# Patient Record
Sex: FEMALE | Race: BLACK OR AFRICAN AMERICAN | NOT HISPANIC OR LATINO
[De-identification: names, ages, dates, MRNs, and addresses within clinical notes are randomized per-mention and may not be internally consistent; named-entity substitution may affect disease eponyms.]

---

## 2021-08-25 ENCOUNTER — APPOINTMENT (OUTPATIENT)
Dept: DERMATOLOGY | Facility: CLINIC | Age: 26
End: 2021-08-25
Payer: COMMERCIAL

## 2021-08-25 VITALS — HEIGHT: 63 IN | BODY MASS INDEX: 30.12 KG/M2 | WEIGHT: 170 LBS

## 2021-08-25 DIAGNOSIS — L30.4 ERYTHEMA INTERTRIGO: ICD-10-CM

## 2021-08-25 DIAGNOSIS — Z84.0 FAMILY HISTORY OF DISEASES OF THE SKIN AND SUBCUTANEOUS TISSUE: ICD-10-CM

## 2021-08-25 DIAGNOSIS — L81.9 DISORDER OF PIGMENTATION, UNSPECIFIED: ICD-10-CM

## 2021-08-25 PROCEDURE — 10040 EXTRACTION: CPT

## 2021-08-25 PROCEDURE — 99203 OFFICE O/P NEW LOW 30 MIN: CPT | Mod: 25

## 2021-08-25 NOTE — ASSESSMENT
[FreeTextEntry1] : intertrigo\par mix triamcinolone 0.1% cream with ketoconazole cream BID x2 weeks; SED\par \par acne\par Acne surgery was performed with an 11 blade to areas on forehead and cheek; SED\par tretinoin 0.025% cream at bedtime; SED

## 2021-08-25 NOTE — HISTORY OF PRESENT ILLNESS
[FreeTextEntry1] : rash under breasts [de-identified] : 26 year old female with rash under breasts and acne. no tx tried.

## 2021-08-25 NOTE — PHYSICAL EXAM
[FreeTextEntry3] : AAOx3, pleasant, NAD, no visual lymphadenopathy\par hair, scalp, face, nose, eyelids, ears, lips, oropharynx, neck, chest, abdomen, back, right arm, left arm, nails, and hands examined with all normal findings,\par pertinent findings include:\par \par multiple papules, pustules and open comedones on face\par erythema and hyperpigmentation under breasts

## 2021-09-24 ENCOUNTER — TRANSCRIPTION ENCOUNTER (OUTPATIENT)
Age: 26
End: 2021-09-24

## 2022-08-22 ENCOUNTER — APPOINTMENT (OUTPATIENT)
Dept: CARDIOLOGY | Facility: CLINIC | Age: 27
End: 2022-08-22

## 2022-08-22 ENCOUNTER — NON-APPOINTMENT (OUTPATIENT)
Age: 27
End: 2022-08-22

## 2022-08-22 VITALS
BODY MASS INDEX: 34.2 KG/M2 | SYSTOLIC BLOOD PRESSURE: 92 MMHG | WEIGHT: 193 LBS | OXYGEN SATURATION: 99 % | DIASTOLIC BLOOD PRESSURE: 60 MMHG | HEART RATE: 84 BPM | HEIGHT: 63 IN

## 2022-08-22 VITALS — DIASTOLIC BLOOD PRESSURE: 66 MMHG | SYSTOLIC BLOOD PRESSURE: 100 MMHG

## 2022-08-22 DIAGNOSIS — R94.31 ABNORMAL ELECTROCARDIOGRAM [ECG] [EKG]: ICD-10-CM

## 2022-08-22 DIAGNOSIS — R07.89 OTHER CHEST PAIN: ICD-10-CM

## 2022-08-22 PROCEDURE — 93242 EXT ECG>48HR<7D RECORDING: CPT

## 2022-08-22 PROCEDURE — 99204 OFFICE O/P NEW MOD 45 MIN: CPT | Mod: 25

## 2022-08-22 PROCEDURE — 93015 CV STRESS TEST SUPVJ I&R: CPT

## 2022-08-22 PROCEDURE — 93000 ELECTROCARDIOGRAM COMPLETE: CPT | Mod: 59

## 2022-08-22 RX ORDER — KETOCONAZOLE 20 MG/G
2 CREAM TOPICAL
Qty: 1 | Refills: 2 | Status: DISCONTINUED | COMMUNITY
Start: 2021-08-25 | End: 2022-08-22

## 2022-08-22 RX ORDER — TRIAMCINOLONE ACETONIDE 1 MG/G
0.1 CREAM TOPICAL
Qty: 1 | Refills: 2 | Status: DISCONTINUED | COMMUNITY
Start: 2021-08-25 | End: 2022-08-22

## 2022-08-22 RX ORDER — TRETINOIN 0.25 MG/G
0.03 CREAM TOPICAL
Qty: 1 | Refills: 2 | Status: DISCONTINUED | COMMUNITY
Start: 2021-08-25 | End: 2022-08-22

## 2022-08-22 NOTE — PHYSICAL EXAM
[Well Developed] : well developed [Well Nourished] : well nourished [No Acute Distress] : no acute distress [Normal Conjunctiva] : normal conjunctiva [Normal Venous Pressure] : normal venous pressure [No Carotid Bruit] : no carotid bruit [Normal S1, S2] : normal S1, S2 [No Rub] : no rub [No Gallop] : no gallop [Clear Lung Fields] : clear lung fields [Good Air Entry] : good air entry [No Respiratory Distress] : no respiratory distress  [Soft] : abdomen soft [Non Tender] : non-tender [No Masses/organomegaly] : no masses/organomegaly [Normal Bowel Sounds] : normal bowel sounds [Normal Gait] : normal gait [No Edema] : no edema [No Cyanosis] : no cyanosis [No Clubbing] : no clubbing [No Varicosities] : no varicosities [No Rash] : no rash [No Skin Lesions] : no skin lesions [Moves all extremities] : moves all extremities [No Focal Deficits] : no focal deficits [Normal Speech] : normal speech [Alert and Oriented] : alert and oriented [Normal memory] : normal memory [Murmur] : murmur [de-identified] : I/VI SELENE base

## 2022-08-22 NOTE — DISCUSSION/SUMMARY
[FreeTextEntry1] : 3-day cardiac monitor.\par Ex stress test to eval for evid of myocardial ischemia or exercise induced arrhythmia.\par Echo to eval LV function.\par Discussed potential triggers of ectopy.\par Further rec based on above.

## 2022-09-02 ENCOUNTER — NON-APPOINTMENT (OUTPATIENT)
Age: 27
End: 2022-09-02

## 2022-09-02 PROCEDURE — 93244 EXT ECG>48HR<7D REV&INTERPJ: CPT

## 2022-09-09 ENCOUNTER — NON-APPOINTMENT (OUTPATIENT)
Age: 27
End: 2022-09-09

## 2022-09-15 ENCOUNTER — APPOINTMENT (OUTPATIENT)
Dept: CARDIOLOGY | Facility: CLINIC | Age: 27
End: 2022-09-15

## 2022-09-15 ENCOUNTER — TRANSCRIPTION ENCOUNTER (OUTPATIENT)
Age: 27
End: 2022-09-15

## 2022-09-15 PROCEDURE — 93306 TTE W/DOPPLER COMPLETE: CPT

## 2022-09-27 ENCOUNTER — OUTPATIENT (OUTPATIENT)
Dept: OUTPATIENT SERVICES | Facility: HOSPITAL | Age: 27
LOS: 1 days | End: 2022-09-27
Payer: MEDICAID

## 2022-09-27 ENCOUNTER — APPOINTMENT (OUTPATIENT)
Dept: MRI IMAGING | Facility: CLINIC | Age: 27
End: 2022-09-27

## 2022-09-27 DIAGNOSIS — I47.2 VENTRICULAR TACHYCARDIA: ICD-10-CM

## 2022-09-27 DIAGNOSIS — I51.9 HEART DISEASE, UNSPECIFIED: ICD-10-CM

## 2022-09-27 PROCEDURE — A9585: CPT

## 2022-09-27 PROCEDURE — 75561 CARDIAC MRI FOR MORPH W/DYE: CPT

## 2022-09-27 PROCEDURE — 75561 CARDIAC MRI FOR MORPH W/DYE: CPT | Mod: 26

## 2022-09-30 ENCOUNTER — TRANSCRIPTION ENCOUNTER (OUTPATIENT)
Age: 27
End: 2022-09-30

## 2022-10-06 ENCOUNTER — APPOINTMENT (OUTPATIENT)
Dept: CARDIOLOGY | Facility: CLINIC | Age: 27
End: 2022-10-06

## 2022-10-06 VITALS
HEIGHT: 63 IN | OXYGEN SATURATION: 98 % | HEART RATE: 88 BPM | SYSTOLIC BLOOD PRESSURE: 100 MMHG | BODY MASS INDEX: 32.78 KG/M2 | DIASTOLIC BLOOD PRESSURE: 62 MMHG | WEIGHT: 185 LBS

## 2022-10-06 PROCEDURE — 99214 OFFICE O/P EST MOD 30 MIN: CPT

## 2022-10-06 NOTE — DISCUSSION/SUMMARY
[FreeTextEntry1] : Referred to EP for consultation.  Although MRI findings not specific for ARVC I am concerned about her symptoms and ventricular arrhythmia.  \par PVC frequency was not high enough, I believe, to be suspicious of PVC induced RV myopathy.\par Advised call promptly for any recurrent lightheadedness.\par Follow up 3 mo\par

## 2022-10-06 NOTE — HISTORY OF PRESENT ILLNESS
[FreeTextEntry1] : Patient here for follow up.\par Test results reviewed with patient incl monitor and MRI.\par Cardiac MRI showed dilated RV with reduced RV function, but no evid of myocardial scarring.\par Palpitations improved in frequency.\par She reports one episode of near syncope in May when she was outside walking to bus stop and felt onset of lightheadedness and had to go to ground in order to maintain consciousness.  No recurrence.

## 2022-11-02 ENCOUNTER — APPOINTMENT (OUTPATIENT)
Dept: DERMATOLOGY | Facility: CLINIC | Age: 27
End: 2022-11-02

## 2022-11-02 DIAGNOSIS — L30.9 DERMATITIS, UNSPECIFIED: ICD-10-CM

## 2022-11-02 PROCEDURE — 99214 OFFICE O/P EST MOD 30 MIN: CPT

## 2022-11-02 NOTE — PHYSICAL EXAM
2 [FreeTextEntry3] : Patient was alert, well-nourished, conjunctiva non-injected, no visual lymphadenopathy, no clubbing, no edema, no bromhidrosis, and no chromhidrosis\par Focused skin exam performed with the relevant portions of the exam performed today. \par AAx3, NAD, well-appearing/pleasant.\par Focused examination within normal limits with the exception of:\par \par - Several scattered open and closed comedones on the forehead, cheeks and chin.\par - Brown and violet hyperpigmented macules on the jaw and cheeks. \par \par - Several open and closed comedones on the lower back with scattered hyperpigmented macules\par \par - 1 small skin colored papule on R dorsal hand (3rd MCP); 1 similar-appearing lesion on R central palm; 1 similar appearing lesion on L dorsal hand (2nd MCP)\par  3

## 2022-11-02 NOTE — HISTORY OF PRESENT ILLNESS
[FreeTextEntry1] : Returning patient, acne & bumps on hands [de-identified] : 28 yo F presenting today for evaluation of the following:\par LV: 8/25/2021\par \par 1. F/u #Acne, face and x 2 years\par - previously tried: Tretinoin 0.025% cream. Was helpful but she finished the prescription. Tazorac 0.05% cream (2016), OTC Sal acid\par - currently using: Dove body wash BID\par - M (body and face): Bath and Body works lotion \par - +/- flares around menstruation, has regular periods, no oral contraception. \par - Pt not planning to become pregnant within the next 5 years.\par \par 2. Bumps on hands, started 3 months ago, asymptomatic.\par - Denies pruritus, pain, no fluid will come out. \par Hx of atopic dermatitis, flares in monique, mainly AC and popliteal fossae. \par \par \par \par

## 2022-11-02 NOTE — ASSESSMENT
[FreeTextEntry1] : 1. Acne vulgaris - moderate inflammatory/hormonal w/ comedonal component \par - Diagnosis and course of condition discussed \par - Reviewed expected time course of improving on topical regimen (can take 6-8 weeks for earliest signs of improvement; 3-6 months should reach maximum anticipated improvement) \par - STOP washing face with Dove bodywash and applying Bath & Body works lotion.\par - START OTC benzoyl peroxide wash 4% wash face and back once a day; can start slowly and advance to daily as tolerated. Discussed OTC PanOxyl wash.\par SED including dryness, irritation, bleaching of towels\par - Given hormonal component to pt's acne, discussed spironolactone in detail and noted that topical regimen may not be as effective for pt's type of acne. Pt agrees with starting Spironolactone.\par START Spironolactone 50mg BID.\par We have discussed possible adverse effects of the medication including headache, lightheadedness, dizziness, low blood pressure, breast tenderness, menstrual irregularities, increased urination, and increased potassium levels. \par Patient has been instructed to avoid a high potassium diet. \par We have discussed that pregnancy may result in fetal abnormalities while on this medication and 3 months after completing course, and that the Patient must take adequate measures to eliminate the risk of conceiving or participation in conceiving while on this medication. Patient expressed understanding of these risks.\par \par - Discussed liberal use of non-comedogenic moisturizers. Pt will start with applying CeraVe AM lotion in the morning and CeraVe PM lotion at night.\par - Advised against skin picking behaviors as this can worsen scarring and post-inflammatory hyperpigmentation.\par \par #Atopic dermatitis, chronic, resolving, mild flare on b/l hands- \par We have discussed the nature and course of this condition.\par A proper skin care regimen with short lukewarm showers, moisturizing soaps, and liberal application of moisturizers was discussed.\par We have emphasized the importance of this regimen in improving this condition.\par We have discussed treatment options, expectations from treatments, and associated side effects from topical therapies.\par Pt will try moisturizing with CeraVe cream throughout the day. No prescription tx indicated at this time. She understands that she can contact the office at any time if flaring/symptomatic. \par \par RTC in 3 months or sooner as warranted.\par \par \par \par \par

## 2022-11-15 ENCOUNTER — APPOINTMENT (OUTPATIENT)
Dept: ELECTROPHYSIOLOGY | Facility: CLINIC | Age: 27
End: 2022-11-15

## 2022-11-15 ENCOUNTER — NON-APPOINTMENT (OUTPATIENT)
Age: 27
End: 2022-11-15

## 2022-11-15 VITALS — SYSTOLIC BLOOD PRESSURE: 114 MMHG | HEART RATE: 80 BPM | OXYGEN SATURATION: 98 % | DIASTOLIC BLOOD PRESSURE: 77 MMHG

## 2022-11-15 PROCEDURE — 99205 OFFICE O/P NEW HI 60 MIN: CPT | Mod: 25

## 2022-11-15 PROCEDURE — 93000 ELECTROCARDIOGRAM COMPLETE: CPT

## 2022-11-16 NOTE — PHYSICAL EXAM
[Well Developed] : well developed [Well Nourished] : well nourished [No Acute Distress] : no acute distress [Normal Conjunctiva] : normal conjunctiva [Normal Venous Pressure] : normal venous pressure [No Carotid Bruit] : no carotid bruit [Normal S1, S2] : normal S1, S2 [No Murmur] : no murmur [No Rub] : no rub [No Gallop] : no gallop [Clear Lung Fields] : clear lung fields [Good Air Entry] : good air entry [No Respiratory Distress] : no respiratory distress  [Soft] : abdomen soft [Non Tender] : non-tender [No Masses/organomegaly] : no masses/organomegaly [Normal Gait] : normal gait [Normal Bowel Sounds] : normal bowel sounds [No Edema] : no edema [No Cyanosis] : no cyanosis [No Clubbing] : no clubbing [No Varicosities] : no varicosities [No Rash] : no rash [No Skin Lesions] : no skin lesions [Moves all extremities] : moves all extremities [No Focal Deficits] : no focal deficits [Normal Speech] : normal speech [Normal memory] : normal memory [Alert and Oriented] : alert and oriented

## 2022-11-16 NOTE — DISCUSSION/SUMMARY
[EKG obtained to assist in diagnosis and management of assessed problem(s)] : EKG obtained to assist in diagnosis and management of assessed problem(s) [FreeTextEntry1] : Ms. Hernandez is a very pleasant 27 year old woman with multifocal ventricular ectopy.  Evaluation has been significant for an abnormal ECG (low voltage, right atrial enlargement, precordial T wave inversions), and a dilated RV with systolic dysfunction.  I have personally reviewed the MRI with cardiac imaging MD and believe that there is some segmentality to the RV dysfunction. Additionally there is some thinning of the basal anterior wall.  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  I suggested further evaluation.  We will obtain a CT of the heart to assess for fat.  I also suggested an evaluation with Dr Vences for genetic evaluation.  For now we will start a low dose beta blocker.  We did discuss the role of EP testing as well as possible ICD (S-ICD) implantation.   She was advised not to start a new exercise regimen at this time.  She will return for follow-up after completion of Chest CT and genetics testing., sooner should she have any episodes of lightheadedness/dizziness or worsening palpitations.

## 2022-11-16 NOTE — END OF VISIT
[FreeTextEntry3] : Seen and examined with ACP.  I agree with H and P, A and P.\par  [Time Spent: ___ minutes] : I have spent [unfilled] minutes of time on the encounter.

## 2022-11-16 NOTE — HISTORY OF PRESENT ILLNESS
[FreeTextEntry1] : Ms. Hernandez is a 27 year old female who presents today for evaluation of PVCs and near syncope.  \par \par She went to her annual physical examination with a new PMD, at which time EKG which showed sinus rhythm with frequent PVCs.  In an ETT on 8/22/22, she completed 7 minutes and 30 seconds of exercise, during which frequent VPDs were observed with rest, stress, and recovery.  Echocardiogram (9/15/22) revealed a normal MV with no MR, normal trileaflet aortic valve, mildly dilated LA.  Normal LV internal dimensions and wall thicknesses, normal LV systolic function, and no segmental wall motion abnormalities.  The RV was normal in size, but decreased RV systolic function was noted.  Cardiac MRI performed 10/6/22 revealed a dilated RV (index RVEDV 102ml/m2), mildly hypokinetic with a calculated RV ejection fraction of 43%, but no evidence of myocardial scarring.  \par \par In May, she was walking to the bus stop ~5 minutes away from her home in the morning when she suddenly felt severe palpitations with associated lightheadedness/dizziness before she "blacked out" and woke up on the ground with a woman helping her.  When she woke back up, she was still having palpitations, which resolved after resting for ~10-15 minutes.  Since then, she has curtailed her activity level, since her symptoms usually presented themselves with exercise.  She remains asymptomatic at rest.  \par \par She denies any family history of SCD.  Her father does have cardiac disease, details unknown, and she believes that it may run in his side of the family.  Does not drink alcohol, smoke, or use recreational drugs.  Occasionally drinks green tea.  \par \par ECG shows sinus rhythm at  81bpm with low voltage in the precordial leads, LAE, and inverted T waves

## 2022-11-16 NOTE — CARDIOLOGY SUMMARY
[de-identified] : Rogero XT 8/22 - 8/25/2022\par Occasional PVCs (3.6%). A predominant morphology is 2.1% burden\par  [de-identified] : ETT 8/22/2022\par 7 minutes and 30 seconds of exercise\par Frequent VPDs with rest, stress and recovery  [de-identified] : 9/15/2022\par Normal MV with no MR\par Normal trileaflet aortic valve\par Mildly dilated LA.\par Normal LV internal dimensions and wall thicknesses\par Normal LV systolic function. No segmental wall motion abnormalities\par Normal RV size with decreased RV systolic function. [de-identified] : 9/27/2022\par The RV is dilated (index RVEDV is 102 mL.m2) and appears mildly hypokinetic with calculated RVEF of 43%.

## 2022-12-19 ENCOUNTER — APPOINTMENT (OUTPATIENT)
Dept: CARDIOLOGY | Facility: CLINIC | Age: 27
End: 2022-12-19

## 2022-12-19 VITALS
BODY MASS INDEX: 32.78 KG/M2 | HEIGHT: 63 IN | SYSTOLIC BLOOD PRESSURE: 115 MMHG | HEART RATE: 73 BPM | DIASTOLIC BLOOD PRESSURE: 82 MMHG | WEIGHT: 185 LBS | OXYGEN SATURATION: 100 %

## 2022-12-19 DIAGNOSIS — Z82.49 FAMILY HISTORY OF ISCHEMIC HEART DISEASE AND OTHER DISEASES OF THE CIRCULATORY SYSTEM: ICD-10-CM

## 2022-12-19 PROCEDURE — 93000 ELECTROCARDIOGRAM COMPLETE: CPT

## 2022-12-19 PROCEDURE — 99214 OFFICE O/P EST MOD 30 MIN: CPT | Mod: 25

## 2022-12-19 NOTE — REASON FOR VISIT
[FreeTextEntry3] : Dear Dr. Powell. I saw your patient MERY MARQUES on 12/19/2022 . Please see the note below for the assessment and plan. \par \par MERY MARQUES  was seen  for an initial consultation at the [facility] at [facility] on 12/19/2022.   Ms. MARQUES was referred by Dr Powell  for hereditary cardiac predisposition risk assessment and counseling, due to hx ARVD\par \par

## 2022-12-19 NOTE — REVIEW OF SYSTEMS
[Palpitations] : palpitations [Exercise Intolerance] : exercise intolerance [Negative] : Neurological [Edema] : no edema

## 2022-12-19 NOTE — PHYSICAL EXAM
[Normal] : no rash, no stigmata of neurocutaneous disease [Tremor] : no tremor [de-identified] : UE 5/5 LE 4.5/5

## 2022-12-19 NOTE — FAMILY HISTORY
[FreeTextEntry1] : FamilyHistory_20_twCiteListControlStart FamilyHistory_20_twCiteListControlEnd [id number] \par A four-generation family history was constructed and scanned into Logged[employer]. \par Family history is significant for: \par siblings\par half sister shared mother  14 yo healthy\par \par Mother 51 yo healthy \par 50s-60s\par Maternal aunts x4\par aunt 59 yo healthy\par aunt 50s med hx unk\par aunt 80s hx dementia\par aunt 56 yo hx ESRD HD s/p renal tx\par Maternal uncles x3\par uncle dec cva, ? kidney prob dec 60s/70s\par uncle dec sickle cell anemia\par multiple cousins healthy\par Maternal Grandmother dec 50s DM in [place] \par Maternal Grandfather dec 1999 \par \par Father 50 yo CHF, liver cirrhosis, HTN, smoker, ETOH\par Paternal aunts x3 med hx unk\par aunt 60s\par aunt 60s\par aunt 49 yo\par Paternal uncles none\par Paternal Grandfather 70s healthy\par Paternal Grandmother 77 yo hx Parkinson disease ., DM , HTN\par \par children: none\par \par her maternal families originate from [place] and paternal families of  origin \par No Ashkenazi [employer] ancestry. \par Family history was negative for consanguinity  \par No family history of SIDS\par  \par \par  [FreeTextEntry2] : [place]  [FreeTextEntry3] : AA

## 2022-12-20 ENCOUNTER — APPOINTMENT (OUTPATIENT)
Dept: CT IMAGING | Facility: CLINIC | Age: 27
End: 2022-12-20

## 2022-12-20 ENCOUNTER — OUTPATIENT (OUTPATIENT)
Dept: OUTPATIENT SERVICES | Facility: HOSPITAL | Age: 27
LOS: 1 days | End: 2022-12-20
Payer: MEDICAID

## 2022-12-20 DIAGNOSIS — I49.3 VENTRICULAR PREMATURE DEPOLARIZATION: ICD-10-CM

## 2022-12-20 DIAGNOSIS — Z00.8 ENCOUNTER FOR OTHER GENERAL EXAMINATION: ICD-10-CM

## 2022-12-20 PROCEDURE — 75574 CT ANGIO HRT W/3D IMAGE: CPT

## 2022-12-20 PROCEDURE — 75574 CT ANGIO HRT W/3D IMAGE: CPT | Mod: 26

## 2023-01-03 ENCOUNTER — APPOINTMENT (OUTPATIENT)
Dept: CARDIOLOGY | Facility: CLINIC | Age: 28
End: 2023-01-03
Payer: MEDICAID

## 2023-01-03 VITALS
WEIGHT: 188 LBS | HEIGHT: 63 IN | HEART RATE: 70 BPM | DIASTOLIC BLOOD PRESSURE: 74 MMHG | BODY MASS INDEX: 33.31 KG/M2 | OXYGEN SATURATION: 98 % | SYSTOLIC BLOOD PRESSURE: 108 MMHG

## 2023-01-03 DIAGNOSIS — I49.3 VENTRICULAR PREMATURE DEPOLARIZATION: ICD-10-CM

## 2023-01-03 DIAGNOSIS — R55 SYNCOPE AND COLLAPSE: ICD-10-CM

## 2023-01-03 DIAGNOSIS — I47.29 OTHER VENTRICULAR TACHYCARDIA: ICD-10-CM

## 2023-01-03 DIAGNOSIS — R00.2 PALPITATIONS: ICD-10-CM

## 2023-01-03 PROCEDURE — 99214 OFFICE O/P EST MOD 30 MIN: CPT

## 2023-01-03 NOTE — DISCUSSION/SUMMARY
[FreeTextEntry1] : Advised continue beta blocker for now.\par Advised follow up with Dr Vences for results of genetic testing and then follow up with EP regarding long-term management.\par All questions answered.\par Follow up 6 mo.\par

## 2023-01-03 NOTE — HISTORY OF PRESENT ILLNESS
[FreeTextEntry1] : Here for follow up. Saw EP Dr Powell in consultation, had cardiac CTA that showed normal coronaries and no evid of fat infiltration of RV.  Was referred to Dr Vences for cardiac genetics consultation and had genetic testing done incl for ARVC results still pending.\par She was prescribed low dose beta blocker by Dr Powell and reports feeling OK, she states if she does not take it then starts to develop palpitations which improve with medication.  No recurrences of presyncope.\par

## 2023-01-23 LAB — COMBINED CARDIAC PANEL: POSITIVE

## 2023-01-25 NOTE — HISTORY OF PRESENT ILLNESS
[facility]  23 10:00  Mirna Adams PA-C                                   Appt ______________      Patient name: Polo TRUJILLO Clarkemariam  Follow Up Appt:         : 1938               CHF / PA / Clinic:    MRN: [id number]                   NP follow up:    Blood pressure: ________   Referral to:    Pulse:   _______    Weight: _______    Height: _______     Refills:    Resp: ________    SpO2: ________    ________________________________________________________________    ________________________________________________________________      ___ Echocardiogram    ___ Sleep Study    ___ Carotid Doppler    ___ Overnight Oximetry    ___ Stress Test     ___ Home Sleep Study    ___ Venous Doppler    ___ Holter Monitor     ___ Arterial Doppler    ___ Event Monitor     ___ US      ___ CT    ___ MRI      ___ CTA    ___ EKG      ___ Labs     [FreeTextEntry1] : MERY MARQUES is a 28 yo F PMH PVCs, decreased RV fxn on TTE and cMRI\par \par hx of PVC began with exercise \par \par Hx palpitations and presyncope\par palpitations improve with rest\par \par no hx of syncope\par \par today she is referred for a cardiogenomic evaluation

## 2023-01-26 ENCOUNTER — NON-APPOINTMENT (OUTPATIENT)
Age: 28
End: 2023-01-26

## 2023-01-27 ENCOUNTER — NON-APPOINTMENT (OUTPATIENT)
Age: 28
End: 2023-01-27

## 2023-01-29 ENCOUNTER — NON-APPOINTMENT (OUTPATIENT)
Age: 28
End: 2023-01-29

## 2023-01-31 ENCOUNTER — APPOINTMENT (OUTPATIENT)
Dept: ELECTROPHYSIOLOGY | Facility: CLINIC | Age: 28
End: 2023-01-31
Payer: MEDICAID

## 2023-01-31 ENCOUNTER — NON-APPOINTMENT (OUTPATIENT)
Age: 28
End: 2023-01-31

## 2023-01-31 VITALS
SYSTOLIC BLOOD PRESSURE: 121 MMHG | OXYGEN SATURATION: 99 % | HEIGHT: 63 IN | HEART RATE: 86 BPM | DIASTOLIC BLOOD PRESSURE: 78 MMHG | WEIGHT: 190 LBS | BODY MASS INDEX: 33.66 KG/M2

## 2023-01-31 PROCEDURE — 93000 ELECTROCARDIOGRAM COMPLETE: CPT

## 2023-01-31 PROCEDURE — 99213 OFFICE O/P EST LOW 20 MIN: CPT | Mod: 25

## 2023-02-02 NOTE — DISCUSSION/SUMMARY
[FreeTextEntry1] : Ms. Hernandez is a very pleasant 27 year old woman with multifocal ventricular ectopy.  Evaluation has been significant for an abnormal ECG (low voltage, right atrial enlargement, precordial T wave inversions), and a dilated RV with systolic dysfunction.  I have personally reviewed the MRI with cardiac imaging MD and believe that there is some segmentality to the RV dysfunction. Additionally there is some thinning of the basal anterior wall.  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  She is tolerating the low dose beta blocker. We discussed the role of primary prevention ICD (which I recommend).  She did screen in for S-ICD.  She will contact me if she wants to proceed.

## 2023-02-02 NOTE — CARDIOLOGY SUMMARY
[de-identified] : today: Sinus  Rhythm \par  -Combined atrial enlargement. \par  -  Diffuse nonspecific T-abnormality.  [de-identified] : Rogero XT 8/22 - 8/25/2022\par Occasional PVCs (3.6%). A predominant morphology is 2.1% burden\par  [de-identified] : ETT 8/22/2022\par 7 minutes and 30 seconds of exercise\par Frequent VPDs with rest, stress and recovery  [de-identified] : 9/15/2022\par Normal MV with no MR\par Normal trileaflet aortic valve\par Mildly dilated LA.\par Normal LV internal dimensions and wall thicknesses\par Normal LV systolic function. No segmental wall motion abnormalities\par Normal RV size with decreased RV systolic function. [de-identified] : 9/27/2022\par The RV is dilated (index RVEDV is 102 mL.m2) and appears mildly hypokinetic with calculated RVEF of 43%. [de-identified] : 1/23/2023\par PKP2 Autosomal dominant mutation

## 2023-02-02 NOTE — HISTORY OF PRESENT ILLNESS
[FreeTextEntry1] : Ms. Hernandez is a very pleasant 27 year old woman with multifocal ventricular ectopy.  Evaluation has been significant for an abnormal ECG (low voltage, right atrial enlargement, precordial T wave inversions), and a dilated RV with systolic dysfunction.  I have personally reviewed the MRI with cardiac imaging MD and believe that there is some segmentality to the RV dysfunction. Additionally there is some thinning of the basal anterior wall.  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  She presents today to discuss further. She is tolerating the beta blocker and actually feels better.

## 2023-02-14 ENCOUNTER — APPOINTMENT (OUTPATIENT)
Dept: CARDIOLOGY | Facility: CLINIC | Age: 28
End: 2023-02-14
Payer: MEDICAID

## 2023-02-14 VITALS
WEIGHT: 188 LBS | HEIGHT: 63 IN | OXYGEN SATURATION: 99 % | BODY MASS INDEX: 33.31 KG/M2 | DIASTOLIC BLOOD PRESSURE: 78 MMHG | HEART RATE: 84 BPM | SYSTOLIC BLOOD PRESSURE: 118 MMHG

## 2023-02-14 PROCEDURE — 99213 OFFICE O/P EST LOW 20 MIN: CPT

## 2023-02-14 NOTE — DISCUSSION/SUMMARY
[FreeTextEntry1] : Agree with EP recommnedation for ICD, and patient prefers subcutaneous.\par Will contact Dr Powell to schedule.\par Advised continue beta blocker.\par Advised mild to moderate exercise OK.\par Follow up after ICD implant.\par

## 2023-02-14 NOTE — HISTORY OF PRESENT ILLNESS
[FreeTextEntry1] : Here for follow up and to discuss results of testing and treatment plan.\par Feels OK. Still with some palpitations if she forgets to take her Atenolol, but overall feels better.\par Results of tests incl genetic testing reviewed and discussed with patient and her mother.\par She has been recommended to have ICD by Dr Powell.

## 2023-02-22 ENCOUNTER — OUTPATIENT (OUTPATIENT)
Dept: OUTPATIENT SERVICES | Facility: HOSPITAL | Age: 28
LOS: 1 days | End: 2023-02-22
Payer: MEDICAID

## 2023-02-22 VITALS
HEIGHT: 63 IN | DIASTOLIC BLOOD PRESSURE: 76 MMHG | OXYGEN SATURATION: 98 % | WEIGHT: 194.01 LBS | RESPIRATION RATE: 20 BRPM | HEART RATE: 82 BPM | SYSTOLIC BLOOD PRESSURE: 118 MMHG | TEMPERATURE: 98 F

## 2023-02-22 DIAGNOSIS — I42.8 OTHER CARDIOMYOPATHIES: ICD-10-CM

## 2023-02-22 DIAGNOSIS — Z01.818 ENCOUNTER FOR OTHER PREPROCEDURAL EXAMINATION: ICD-10-CM

## 2023-02-22 LAB
ANION GAP SERPL CALC-SCNC: 13 MMOL/L — SIGNIFICANT CHANGE UP (ref 5–17)
BLD GP AB SCN SERPL QL: NEGATIVE — SIGNIFICANT CHANGE UP
BUN SERPL-MCNC: 14 MG/DL — SIGNIFICANT CHANGE UP (ref 7–23)
CALCIUM SERPL-MCNC: 9.4 MG/DL — SIGNIFICANT CHANGE UP (ref 8.4–10.5)
CHLORIDE SERPL-SCNC: 104 MMOL/L — SIGNIFICANT CHANGE UP (ref 96–108)
CO2 SERPL-SCNC: 20 MMOL/L — LOW (ref 22–31)
CREAT SERPL-MCNC: 0.72 MG/DL — SIGNIFICANT CHANGE UP (ref 0.5–1.3)
EGFR: 118 ML/MIN/1.73M2 — SIGNIFICANT CHANGE UP
GLUCOSE SERPL-MCNC: 85 MG/DL — SIGNIFICANT CHANGE UP (ref 70–99)
HCT VFR BLD CALC: 43 % — SIGNIFICANT CHANGE UP (ref 34.5–45)
HGB BLD-MCNC: 14 G/DL — SIGNIFICANT CHANGE UP (ref 11.5–15.5)
MCHC RBC-ENTMCNC: 29.2 PG — SIGNIFICANT CHANGE UP (ref 27–34)
MCHC RBC-ENTMCNC: 32.6 GM/DL — SIGNIFICANT CHANGE UP (ref 32–36)
MCV RBC AUTO: 89.6 FL — SIGNIFICANT CHANGE UP (ref 80–100)
NRBC # BLD: 0 /100 WBCS — SIGNIFICANT CHANGE UP (ref 0–0)
PLATELET # BLD AUTO: 270 K/UL — SIGNIFICANT CHANGE UP (ref 150–400)
POTASSIUM SERPL-MCNC: 4 MMOL/L — SIGNIFICANT CHANGE UP (ref 3.5–5.3)
POTASSIUM SERPL-SCNC: 4 MMOL/L — SIGNIFICANT CHANGE UP (ref 3.5–5.3)
RBC # BLD: 4.8 M/UL — SIGNIFICANT CHANGE UP (ref 3.8–5.2)
RBC # FLD: 13.3 % — SIGNIFICANT CHANGE UP (ref 10.3–14.5)
RH IG SCN BLD-IMP: POSITIVE — SIGNIFICANT CHANGE UP
SODIUM SERPL-SCNC: 137 MMOL/L — SIGNIFICANT CHANGE UP (ref 135–145)
WBC # BLD: 7.55 K/UL — SIGNIFICANT CHANGE UP (ref 3.8–10.5)
WBC # FLD AUTO: 7.55 K/UL — SIGNIFICANT CHANGE UP (ref 3.8–10.5)

## 2023-02-22 PROCEDURE — 86900 BLOOD TYPING SEROLOGIC ABO: CPT

## 2023-02-22 PROCEDURE — G0463: CPT

## 2023-02-22 PROCEDURE — 86901 BLOOD TYPING SEROLOGIC RH(D): CPT

## 2023-02-22 PROCEDURE — 86850 RBC ANTIBODY SCREEN: CPT

## 2023-02-22 PROCEDURE — 85027 COMPLETE CBC AUTOMATED: CPT

## 2023-02-22 PROCEDURE — 80048 BASIC METABOLIC PNL TOTAL CA: CPT

## 2023-02-22 NOTE — H&P PST ADULT - SKIN/BREAST
Discharge per order. Per oncology and ID notes patient to follow-up with them, included in discharge information. Radiology done today prior to discharge. PIV removed. Discharge information reviewed with patient and all questions answered. Patient discharging home with home O2- home O2 tank with patient. Patient discharging home with family and transportation provided by family.   negative

## 2023-02-22 NOTE — H&P PST ADULT - PAIN ASSESSMENT COMPLETED
CC:  Carlos Simms is here today for swelling in his bilat hands, tingling in his hands when he sleeps at night. Bilateral shoulder is aching   Medications: medications verified, no change  Patient would like communication of their results via:      Cell Phone:   Telephone Information:   Mobile [phone]     Okay to leave a message containing results? Yes      No

## 2023-02-22 NOTE — H&P PST ADULT - NSICDXFAMILYHX_GEN_ALL_CORE_FT
FAMILY HISTORY:  Father  Still living? Yes, Estimated age: 51-60  Family history of cardiomyopathy, Age at diagnosis: Age Unknown

## 2023-02-22 NOTE — H&P PST ADULT - HISTORY OF PRESENT ILLNESS
covid19 february  lumbar bulging disc which left sided  27 year old female accompanied with her mother, presents for preop testing for scheduled ICD implant on 3/8/2023. Patient with cardiac predisposition risk due to h/o arrhythmogenic right ventricular dysplasia, c/o PVC with exercise improve with rest. Her medical history also significant for presyncope, lumbar bulging disc with occasional left sided sciatica,covid-19 infection 2/2022 (mild symptoms, no hospitalization). patient denies any chest pain or pressure at present, no SOB, no palpitations.    She is [facility]-19 [facility]

## 2023-02-22 NOTE — H&P PST ADULT - NSICDXPASTMEDICALHX_GEN_ALL_CORE_FT
PAST MEDICAL HISTORY:  Arrhythmogenic right ventricular dysplasia     H/O acne     H/O low back pain     PVC (premature ventricular contraction)

## 2023-02-22 NOTE — H&P PST ADULT - PROBLEM SELECTOR PLAN 1
Scheduled for ICD implant   preop instruction provided, verbalized understanding and teach back   pt to continue her medication darell-op

## 2023-02-22 NOTE — H&P PST ADULT - NSANTHOSAYNRD_GEN_A_CORE
No. MICHEAL screening performed.  STOP BANG Legend: 0-2 = LOW Risk; 3-4 = INTERMEDIATE Risk; 5-8 = HIGH Risk

## 2023-02-22 NOTE — H&P PST ADULT - ASSESSMENT
Patient able to do own ADL's,  walks to main lobby to [facility] (no SOB, no fatigue), able to walk up 1-2 flights stair, but was advised to limit physical activities; DASI score 5.72  Patient denies any loose or broken tooth   Mallampati 2

## 2023-02-27 ENCOUNTER — NON-APPOINTMENT (OUTPATIENT)
Age: 28
End: 2023-02-27

## 2023-02-27 ENCOUNTER — APPOINTMENT (OUTPATIENT)
Dept: CARDIOLOGY | Facility: CLINIC | Age: 28
End: 2023-02-27
Payer: MEDICAID

## 2023-02-27 VITALS — HEART RATE: 79 BPM | SYSTOLIC BLOOD PRESSURE: 112 MMHG | OXYGEN SATURATION: 99 % | DIASTOLIC BLOOD PRESSURE: 76 MMHG

## 2023-02-27 DIAGNOSIS — I42.8 OTHER CARDIOMYOPATHIES: ICD-10-CM

## 2023-02-27 PROBLEM — I49.3 VENTRICULAR PREMATURE DEPOLARIZATION: Chronic | Status: ACTIVE | Noted: 2023-02-22

## 2023-02-27 PROBLEM — Z87.2 PERSONAL HISTORY OF DISEASES OF THE SKIN AND SUBCUTANEOUS TISSUE: Chronic | Status: ACTIVE | Noted: 2023-02-22

## 2023-02-27 PROBLEM — Z87.39 PERSONAL HISTORY OF OTHER DISEASES OF THE MUSCULOSKELETAL SYSTEM AND CONNECTIVE TISSUE: Chronic | Status: ACTIVE | Noted: 2023-02-22

## 2023-02-27 PROCEDURE — 93000 ELECTROCARDIOGRAM COMPLETE: CPT

## 2023-02-27 PROCEDURE — 99215 OFFICE O/P EST HI 40 MIN: CPT | Mod: 25

## 2023-02-27 NOTE — FAMILY HISTORY
[FreeTextEntry1] : FamilyHistory_20_twCiteListControlStart FamilyHistory_20_twCiteListControlEnd [id number] \par A four-generation family history was constructed and scanned into [employer]. \par Family history is significant for: \par siblings\par half sister shared mother  12 yo healthy\par \par Mother 51 yo healthy \par 50s-60s\par Maternal aunts x4\par aunt 57 yo healthy\par aunt 50s med hx unk\par aunt 80s hx dementia\par aunt 56 yo hx ESRD HD s/p renal tx\par Maternal uncles x3\par uncle dec cva, ? kidney prob dec 60s/70s\par uncle dec sickle cell anemia\par multiple cousins healthy\par Maternal Grandmother dec 50s DM in [place] \par Maternal Grandfather dec 1999 \par \par Father 50 yo CHF, liver cirrhosis, HTN, smoker, ETOH\par Paternal aunts x3 med hx unk\par aunt 60s\par aunt 60s\par aunt 49 yo\par Paternal uncles none\par Paternal Grandfather 70s healthy\par Paternal Grandmother 77 yo hx Parkinson disease ., DM , HTN\par \par children: none\par \par her maternal families originate from [place] and paternal families of  origin \par No Ashkenazi Gnosticist ancestry. \par Family history was negative for consanguinity  \par No family history of SIDS\par  \par \par  [FreeTextEntry2] : [place]  [FreeTextEntry3] : AA

## 2023-02-27 NOTE — HISTORY OF PRESENT ILLNESS
[FreeTextEntry1] : MERY MARQUES is a 26 yo F PMH PVCs, decreased RV fxn on TTE and cMRI\par \par hx of PVC began with exercise \par \par Hx palpitations and presyncope\par palpitations improve with rest\par \par no hx of syncope\par \par she underwent genetic testing and today presents for results

## 2023-02-27 NOTE — PHYSICAL EXAM
[Normal] : no rash, no stigmata of neurocutaneous disease [Tremor] : no tremor [de-identified] : UE 5/5 LE 4.5/5

## 2023-02-27 NOTE — REASON FOR VISIT
[FreeTextEntry3] : Dear Dr. Powell. I saw your patient MERY MARQUES on 2/27/2022 . Please see the note below for the assessment and plan. \par \par MERY MARQUES  was seen  for an initial consultation at the [facility] at [facility] on 12/19/2022.   Ms. MARQUES was referred by Dr Powell  for hereditary cardiac predisposition risk assessment and counseling, due to hx ARVD\par \par

## 2023-03-03 ENCOUNTER — NON-APPOINTMENT (OUTPATIENT)
Age: 28
End: 2023-03-03

## 2023-03-05 ENCOUNTER — OUTPATIENT (OUTPATIENT)
Dept: OUTPATIENT SERVICES | Facility: HOSPITAL | Age: 28
LOS: 1 days | End: 2023-03-05
Payer: MEDICAID

## 2023-03-05 DIAGNOSIS — Z11.52 ENCOUNTER FOR SCREENING FOR COVID-19: ICD-10-CM

## 2023-03-05 PROCEDURE — C9803: CPT

## 2023-03-05 PROCEDURE — U0003: CPT

## 2023-03-05 PROCEDURE — U0005: CPT

## 2023-03-06 LAB — SARS-COV-2 RNA SPEC QL NAA+PROBE: SIGNIFICANT CHANGE UP

## 2023-03-08 ENCOUNTER — OUTPATIENT (OUTPATIENT)
Dept: INPATIENT UNIT | Facility: HOSPITAL | Age: 28
LOS: 1 days | End: 2023-03-08
Payer: MEDICAID

## 2023-03-08 ENCOUNTER — TRANSCRIPTION ENCOUNTER (OUTPATIENT)
Age: 28
End: 2023-03-08

## 2023-03-08 VITALS
SYSTOLIC BLOOD PRESSURE: 110 MMHG | HEART RATE: 74 BPM | DIASTOLIC BLOOD PRESSURE: 71 MMHG | TEMPERATURE: 98 F | HEIGHT: 63 IN | WEIGHT: 190.04 LBS | RESPIRATION RATE: 17 BRPM | OXYGEN SATURATION: 100 %

## 2023-03-08 DIAGNOSIS — I42.8 OTHER CARDIOMYOPATHIES: ICD-10-CM

## 2023-03-08 LAB
BLD GP AB SCN SERPL QL: POSITIVE — SIGNIFICANT CHANGE UP
HCG UR QL: NEGATIVE — SIGNIFICANT CHANGE UP
RH IG SCN BLD-IMP: POSITIVE — SIGNIFICANT CHANGE UP

## 2023-03-08 PROCEDURE — 93010 ELECTROCARDIOGRAM REPORT: CPT

## 2023-03-08 PROCEDURE — 93010 ELECTROCARDIOGRAM REPORT: CPT | Mod: 77

## 2023-03-08 PROCEDURE — 71046 X-RAY EXAM CHEST 2 VIEWS: CPT | Mod: 26

## 2023-03-08 RX ORDER — OXYCODONE HYDROCHLORIDE 5 MG/1
10 TABLET ORAL EVERY 6 HOURS
Refills: 0 | Status: DISCONTINUED | OUTPATIENT
Start: 2023-03-08 | End: 2023-03-09

## 2023-03-08 RX ORDER — SPIRONOLACTONE 25 MG/1
25 TABLET, FILM COATED ORAL DAILY
Refills: 0 | Status: DISCONTINUED | OUTPATIENT
Start: 2023-03-08 | End: 2023-03-22

## 2023-03-08 RX ORDER — FENTANYL CITRATE 50 UG/ML
12.5 INJECTION INTRAVENOUS ONCE
Refills: 0 | Status: DISCONTINUED | OUTPATIENT
Start: 2023-03-08 | End: 2023-03-08

## 2023-03-08 RX ORDER — CEFAZOLIN SODIUM 1 G
2000 VIAL (EA) INJECTION EVERY 8 HOURS
Refills: 0 | Status: COMPLETED | OUTPATIENT
Start: 2023-03-08 | End: 2023-03-09

## 2023-03-08 RX ORDER — CEFAZOLIN SODIUM 1 G
2000 VIAL (EA) INJECTION ONCE
Refills: 0 | Status: COMPLETED | OUTPATIENT
Start: 2023-03-08 | End: 2023-03-08

## 2023-03-08 RX ORDER — ACETAMINOPHEN 500 MG
2 TABLET ORAL
Qty: 0 | Refills: 0 | DISCHARGE
Start: 2023-03-08

## 2023-03-08 RX ORDER — OXYCODONE HYDROCHLORIDE 5 MG/1
5 TABLET ORAL EVERY 6 HOURS
Refills: 0 | Status: DISCONTINUED | OUTPATIENT
Start: 2023-03-08 | End: 2023-03-08

## 2023-03-08 RX ORDER — ACETAMINOPHEN 500 MG
650 TABLET ORAL EVERY 6 HOURS
Refills: 0 | Status: DISCONTINUED | OUTPATIENT
Start: 2023-03-08 | End: 2023-03-22

## 2023-03-08 RX ORDER — METOPROLOL TARTRATE 50 MG
25 TABLET ORAL DAILY
Refills: 0 | Status: DISCONTINUED | OUTPATIENT
Start: 2023-03-08 | End: 2023-03-22

## 2023-03-08 RX ADMIN — FENTANYL CITRATE 12.5 MICROGRAM(S): 50 INJECTION INTRAVENOUS at 17:52

## 2023-03-08 RX ADMIN — OXYCODONE HYDROCHLORIDE 5 MILLIGRAM(S): 5 TABLET ORAL at 17:35

## 2023-03-08 RX ADMIN — Medication 100 MILLIGRAM(S): at 21:58

## 2023-03-08 RX ADMIN — OXYCODONE HYDROCHLORIDE 5 MILLIGRAM(S): 5 TABLET ORAL at 16:36

## 2023-03-08 RX ADMIN — OXYCODONE HYDROCHLORIDE 10 MILLIGRAM(S): 5 TABLET ORAL at 21:58

## 2023-03-08 RX ADMIN — FENTANYL CITRATE 12.5 MICROGRAM(S): 50 INJECTION INTRAVENOUS at 18:06

## 2023-03-08 NOTE — CHART NOTE - NSCHARTNOTEFT_GEN_A_CORE
27 year old female accompanied with her mother, presents for preop testing for scheduled ICD implant on 3/8/2023. Patient with cardiac predisposition risk due to h/o arrhythmogenic right ventricular dysplasia, c/o PVC with exercise improve with rest. Her medical history also significant for presyncope, lumbar bulging disc with occasional left sided sciatica,covid-19 infection 2/2022 (mild symptoms, no hospitalization). patient denies any chest pain or pressure at present, no SOB, no palpitations.    3/8 Now s/p subq ICD ([employer]) VT (220) /VF (250), mid chest w/ ACE wrap. dressing mid chest and left flank intact.  Pt denies any discomfort, fully awake.    Pt may decide to go home tonight with Keflex 500mg po TID x 3 days (first dose prior discharge) OR if pt decides to stay overnight, will receive 2 more doses of Ancef 2g q8hrs starting 9pm (received first dose of 2g Ancef in the EP lab at 1pm).   BR 3 hrs till 6:30pm  PA/LAT today when OOB (ordered)  Received 1g IV Tylenol at 1430    Gurdeep De La Paz NP  [phone]

## 2023-03-08 NOTE — DISCHARGE NOTE PROVIDER - NSDCMRMEDTOKEN_GEN_ALL_CORE_FT
acetaminophen 325 mg oral tablet: 2 tab(s) orally every 6 hours, As needed, Mild Pain (1 - 3)  metoprolol succinate 25 mg oral tablet, extended release: 1 tab(s) orally once a day  multivitamin: 1 tablet orally once a day   spironolactone 25 mg oral tablet: 1 tab(s) orally once a day  for acne   acetaminophen 325 mg oral tablet: 2 tab(s) orally every 6 hours, As needed, Mild Pain (1 - 3)  metoprolol succinate 25 mg oral tablet, extended release: 1 tab(s) orally once a day  Multiple Vitamins oral tablet: 1 tab(s) orally once a day  oxyCODONE 5 mg oral tablet: 1 tab(s) orally every 6 hours, As needed, Moderate Pain (4 - 6) MDD:4 tabs  spironolactone 25 mg oral tablet: 1 tab(s) orally once a day  for acne

## 2023-03-08 NOTE — PRE-ANESTHESIA EVALUATION ADULT - NSANTHOSAYNRD_GEN_A_CORE
Patient resting comfortably.   No. MICHEAL screening performed.  STOP BANG Legend: 0-2 = LOW Risk; 3-4 = INTERMEDIATE Risk; 5-8 = HIGH Risk

## 2023-03-08 NOTE — PACU DISCHARGE NOTE - NAUSEA/VOMITING:
3 days ago patient felt a pinch in her abdomen and noticed a bulge. She was able to reduce the bulge. Patient concerned. Physical examination pleasant alert well orientated no acute distress. Abdomen soft nontender.   No organomegaly rebound or guardi None

## 2023-03-08 NOTE — DISCHARGE NOTE PROVIDER - PROVIDER TOKENS
PROVIDER:[TOKEN:[[id number]],SCHEDULEDAPPT:[03/20/2023]] PROVIDER:[TOKEN:[[id number]],SCHEDULEDAPPT:[03/20/2023],SCHEDULEDAPPTTIME:[10:40 AM]]

## 2023-03-08 NOTE — PATIENT PROFILE ADULT - FALL HARM RISK - UNIVERSAL INTERVENTIONS
Bed in lowest position, wheels locked, appropriate side rails in place/Call bell, personal items and telephone in reach/Instruct patient to call for assistance before getting out of bed or chair/Non-slip footwear when patient is out of bed/[place] to call system/Physically safe environment - no spills, clutter or unnecessary equipment/Purposeful Proactive Rounding/Room/bathroom lighting operational, light cord in reach

## 2023-03-08 NOTE — CHART NOTE - NSCHARTNOTEFT_GEN_A_CORE
MERY MARQUES  [id number]    PROCEDURE: Subcutaneous ICD implantation            INDICATION: ARVC, syncope          ELECTROPHYSIOLOGIST(S): MD Dallas Urbina MD        ANESTHESIOLOGY:  General Anesthesia      FINDINGS: Successful subcutaneous ICD implantation.  Impedance at time of implant 65 ohms.        COMPLICATIONS: None        RECOMMENDATIONS:  -ACE wraps around incision site  -PA/Lateral CXR  -pain control    Dallas Perrin MD  EP Fellow

## 2023-03-08 NOTE — PATIENT PROFILE ADULT - NSPROPOAURINARYCATHETER_GEN_A_NUR
Consent 2/Introductory Paragraph: Mohs surgery was explained to the patient and consent was obtained. The risks, benefits and alternatives to therapy were discussed in detail. Specifically, the risks of infection, scarring, bleeding, prolonged wound healing, incomplete removal, allergy to anesthesia, nerve injury and recurrence were addressed. Prior to the procedure, the treatment site was clearly identified and confirmed by the patient. All components of Universal Protocol/PAUSE Rule completed. no

## 2023-03-08 NOTE — DISCHARGE NOTE PROVIDER - HOSPITAL COURSE
HPI:  27 year old female accompanied with her mother, presents for preop testing for scheduled ICD implant on 3/8/2023. Patient with cardiac predisposition risk due to h/o arrhythmogenic right ventricular dysplasia, c/o PVC with exercise improve with rest. Her medical history also significant for presyncope, lumbar bulging disc with occasional left sided sciatica,covid-19 infection 2/2022 (mild symptoms, no hospitalization). patient denies any chest pain or pressure at present, no SOB, no palpitations.    3/8 s/p [employer] subcutaneous ICD placement. ACE wrap in place, subxiphoid dressing in place.     27 year old female accompanied with her mother, presents for preop testing for scheduled ICD implant on 3/8/2023. Patient with cardiac predisposition risk due to h/o arrhythmogenic right ventricular dysplasia, c/o PVC with exercise improve with rest. Her medical history also significant for presyncope, lumbar bulging disc with occasional left sided sciatica,covid-19 infection 2/2022 (mild symptoms, no hospitalization). patient denies any chest pain or pressure at present, no SOB, no palpitations.    3/8 s/p [employer] subcutaneous ICD placement. ACE wrap in place, subxiphoid dressing in place.   pt had stable overnight stay, CXR reviewed by Dr. Powell, plan to discharge home 3/9.

## 2023-03-08 NOTE — DISCHARGE NOTE PROVIDER - CARE PROVIDER_API CALL
Efrain Powell)  Cardiac Electrophysiology; Cardiology  [address] [place] [address]  Phone: [phone]  Fax: [phone]  Scheduled Appointment: 03/20/2023   Efrain Powell)  Cardiac Electrophysiology; Cardiology  [address] [place] [address]  Phone: [phone]  Fax: [phone]  Scheduled Appointment: 03/20/2023 10:40 AM

## 2023-03-08 NOTE — DISCHARGE NOTE PROVIDER - NSDCFUADDINST_GEN_ALL_CORE_FT
Your incision is closed with either surgical tape, staples or a surgical glue  - DO NOT scrub, rub, or pick at the site    AFTER 3 days, you may shower   - Use mild sop and gentle water stream, then pat dry with a towel   - DO NOT apply lotions, powders, ointments, or perfumes to the incision    DO NOT soak your incisional site in water fo 4-6 weeks (no baths, swimming, hot tub...)  Wear loose clothing around site for 1-2 weeks  IF surgical tape was used DO NOT remove the strips, they will fall off after 7days, if glue was used, it will naturally fall off within 3 weeks  if staples were used, they will be removed in 7-10 days by your doctor    ACTIVITY:  for 2 weeks AFTER  your procedure  - DO NOT RAISE your arm above shoulder level (on the same side of your incision)  for 4 weeks AFTER your procedure   - DO NOT LIFT anything 10 lbs or heavier (on the side of your implant)   - certain activities may be limited longer, those that involve swinging your arm, and will be discussed with your EP doctor  DO NOT DRIVE until your EP Doctor or nurse practitioner/ physician assistant states it is safe to do so  A follow up appointment in 7-14 days will be arranged before your discharge    ID CARD:   you will receive an ID CARD and device company booklet   - please carry that card with you at all times    ***CALL YOUR DOCTOR ***  IF you have fever, chills, body aches, or severe pain, swelling, redness, heat, yellow drainage from your incision site  IF bleeding  or significant new swelling from your puncture site  IF your experience lightheadedness, dizziness, or fainting spell  IF you experience a single shock (like being kicked/punched in the chest) and feel okay, please call the clinic  IF you experience multiple shocks or  single shock and are NOT feeling well, have someone take you to the emergency room or call 911  IF unable to get in contact with your doctor, you may call the Cardiology Office at [facility] at [phone]

## 2023-03-08 NOTE — DISCHARGE NOTE PROVIDER - NSDCFUSCHEDAPPT_GEN_ALL_CORE_FT
[facility] Physician [facility]  ELECTROPH [address]  Scheduled Appointment: 03/20/2023

## 2023-03-08 NOTE — DISCHARGE NOTE PROVIDER - NSDCCPCAREPLAN_GEN_ALL_CORE_FT
PRINCIPAL DISCHARGE DIAGNOSIS  Diagnosis: Arrhythmogenic right ventricular dysplasia  Assessment and Plan of Treatment: Continue with follow-up visits to your electrophysiology team and with your home remote device monitoring (if applicable). Continue your medications as prescribed. Monitor your abdominal walls for swelling ,bleeding. Please refer to the preprinted instruction sheet provided at discharge.      SECONDARY DISCHARGE DIAGNOSES  Diagnosis: HTN (hypertension)  Assessment and Plan of Treatment: Continue with your blood pressure medications; eat a heart healthy diet with low salt diet; exercise regularly (consult with your physician or cardiologist first); maintain a heart healthy weight; if you smoke - quit (A resource to help you stop smoking is the [facility] – phone number [phone].); include healthy ways to manage stress. Continue to follow with your primary care physician or cardiologist.

## 2023-03-09 ENCOUNTER — TRANSCRIPTION ENCOUNTER (OUTPATIENT)
Age: 28
End: 2023-03-09

## 2023-03-09 VITALS
OXYGEN SATURATION: 95 % | DIASTOLIC BLOOD PRESSURE: 66 MMHG | HEART RATE: 85 BPM | RESPIRATION RATE: 17 BRPM | SYSTOLIC BLOOD PRESSURE: 119 MMHG | TEMPERATURE: 98 F

## 2023-03-09 DIAGNOSIS — I10 ESSENTIAL (PRIMARY) HYPERTENSION: ICD-10-CM

## 2023-03-09 LAB
ANION GAP SERPL CALC-SCNC: 10 MMOL/L — SIGNIFICANT CHANGE UP (ref 5–17)
BUN SERPL-MCNC: 10 MG/DL — SIGNIFICANT CHANGE UP (ref 7–23)
CALCIUM SERPL-MCNC: 8.6 MG/DL — SIGNIFICANT CHANGE UP (ref 8.4–10.5)
CHLORIDE SERPL-SCNC: 102 MMOL/L — SIGNIFICANT CHANGE UP (ref 96–108)
CO2 SERPL-SCNC: 23 MMOL/L — SIGNIFICANT CHANGE UP (ref 22–31)
CREAT SERPL-MCNC: 0.55 MG/DL — SIGNIFICANT CHANGE UP (ref 0.5–1.3)
EGFR: 130 ML/MIN/1.73M2 — SIGNIFICANT CHANGE UP
GLUCOSE SERPL-MCNC: 153 MG/DL — HIGH (ref 70–99)
HCT VFR BLD CALC: 40.2 % — SIGNIFICANT CHANGE UP (ref 34.5–45)
HGB BLD-MCNC: 13 G/DL — SIGNIFICANT CHANGE UP (ref 11.5–15.5)
MCHC RBC-ENTMCNC: 29 PG — SIGNIFICANT CHANGE UP (ref 27–34)
MCHC RBC-ENTMCNC: 32.3 GM/DL — SIGNIFICANT CHANGE UP (ref 32–36)
MCV RBC AUTO: 89.5 FL — SIGNIFICANT CHANGE UP (ref 80–100)
NRBC # BLD: 0 /100 WBCS — SIGNIFICANT CHANGE UP (ref 0–0)
PLATELET # BLD AUTO: 322 K/UL — SIGNIFICANT CHANGE UP (ref 150–400)
POTASSIUM SERPL-MCNC: 4.5 MMOL/L — SIGNIFICANT CHANGE UP (ref 3.5–5.3)
POTASSIUM SERPL-SCNC: 4.5 MMOL/L — SIGNIFICANT CHANGE UP (ref 3.5–5.3)
RBC # BLD: 4.49 M/UL — SIGNIFICANT CHANGE UP (ref 3.8–5.2)
RBC # FLD: 13.1 % — SIGNIFICANT CHANGE UP (ref 10.3–14.5)
SODIUM SERPL-SCNC: 135 MMOL/L — SIGNIFICANT CHANGE UP (ref 135–145)
WBC # BLD: 13.56 K/UL — HIGH (ref 3.8–10.5)
WBC # FLD AUTO: 13.56 K/UL — HIGH (ref 3.8–10.5)

## 2023-03-09 PROCEDURE — 93010 ELECTROCARDIOGRAM REPORT: CPT

## 2023-03-09 RX ORDER — OXYCODONE HYDROCHLORIDE 5 MG/1
1 TABLET ORAL
Qty: 12 | Refills: 0
Start: 2023-03-09 | End: 2023-03-11

## 2023-03-09 RX ADMIN — Medication 100 MILLIGRAM(S): at 05:46

## 2023-03-09 RX ADMIN — SPIRONOLACTONE 25 MILLIGRAM(S): 25 TABLET, FILM COATED ORAL at 05:46

## 2023-03-09 RX ADMIN — Medication 25 MILLIGRAM(S): at 05:46

## 2023-03-09 RX ADMIN — OXYCODONE HYDROCHLORIDE 10 MILLIGRAM(S): 5 TABLET ORAL at 09:26

## 2023-03-09 NOTE — DISCHARGE NOTE NURSING/CASE MANAGEMENT/SOCIAL WORK - PATIENT PORTAL LINK FT
You can access the FollowMyHealth Patient Portal offered by [facility] by registering at the following website: [email or web address]. By joining [employer]’s FollowMyHealth portal, you will also be able to view your health information using other applications (apps) compatible with our system.

## 2023-03-09 NOTE — PROGRESS NOTE ADULT - SUBJECTIVE AND OBJECTIVE BOX
HPI:  27 year old female accompanied with her mother, presents for preop testing for scheduled ICD implant on 3/8/2023. Patient with cardiac predisposition risk due to h/o arrhythmogenic right ventricular dysplasia, c/o PVC with exercise improve with rest. Her medical history also significant for presyncope, lumbar bulging disc with occasional left sided sciatica,covid-19 infection 2/2022 (mild symptoms, no hospitalization). patient denies any chest pain or pressure at present, no SOB, no palpitations.    She is scheduled for covid-19 PCR test on 3/5/2023 at [facility]  (22 Feb 2023 15:47)      Allergies    No Known Allergies    Intolerances        Medications:  acetaminophen     Tablet .. 650 milliGRAM(s) Oral every 6 hours PRN  ceFAZolin   IVPB 2000 milliGRAM(s) IV Intermittent every 8 hours  metoprolol succinate ER 25 milliGRAM(s) Oral daily  Nephro-joyce 1 Tablet(s) Oral daily  oxyCODONE    IR 5 milliGRAM(s) Oral every 6 hours PRN  oxyCODONE    IR 10 milliGRAM(s) Oral every 6 hours PRN  spironolactone 25 milliGRAM(s) Oral daily      Vitals:  T(C): 36.7 (03-08-23 @ 16:35), Max: 36.7 (03-08-23 @ 10:27)  HR: 74 (03-08-23 @ 22:35) (68 - 83)  BP: 102/59 (03-08-23 @ 22:35) (96/65 - 110/71)  BP(mean): 68 (03-08-23 @ 22:35) (67 - 77)  RR: 17 (03-08-23 @ 22:35) (14 - 19)  SpO2: 96% (03-08-23 @ 22:35) (94% - 100%)  Wt(kg): --  Daily Height in cm: 160.02 (08 Mar 2023 13:29)    Daily   I&O's Summary    08 Mar 2023 07:01  -  09 Mar 2023 00:40  --------------------------------------------------------  IN: 0 mL / OUT: 0 mL / NET: 0 mL          Physical Exam:  Appearance: Normal  Eyes: PERRL, EOMI  HENT: Normal oral [place], [place]/AT  Cardiovascular: S1S2, RRR, No M/R/G, no JVD, No Lower extremity edema  Procedural Access Site: No hematoma, Non-tender to palpation, 2+ pulse, No bruit, No Ecchymosis  Respiratory: Clear to auscultation bilaterally  Gastrointestinal: Soft, Non tender, Normal Bowel Sounds  Musculoskeletal: No clubbing, No joint deformity   Neurologic: Non-focal  Lymphatic: No lymphadenopathy  Psychiatry: AAOx3, Mood & affect appropriate  Skin: No rashes, No ecchymoses, No cyanosis        Lipid panel   Hgb A1c         ECG: SR 72 bpm

## 2023-03-10 PROCEDURE — 86922 COMPATIBILITY TEST ANTIGLOB: CPT

## 2023-03-10 PROCEDURE — 86870 RBC ANTIBODY IDENTIFICATION: CPT

## 2023-03-10 PROCEDURE — 80048 BASIC METABOLIC PNL TOTAL CA: CPT

## 2023-03-10 PROCEDURE — 93005 ELECTROCARDIOGRAM TRACING: CPT

## 2023-03-10 PROCEDURE — 86850 RBC ANTIBODY SCREEN: CPT

## 2023-03-10 PROCEDURE — C1722: CPT

## 2023-03-10 PROCEDURE — 86901 BLOOD TYPING SEROLOGIC RH(D): CPT

## 2023-03-10 PROCEDURE — 86880 COOMBS TEST DIRECT: CPT

## 2023-03-10 PROCEDURE — 85027 COMPLETE CBC AUTOMATED: CPT

## 2023-03-10 PROCEDURE — 33270 INS/REP SUBQ DEFIBRILLATOR: CPT

## 2023-03-10 PROCEDURE — 81025 URINE PREGNANCY TEST: CPT

## 2023-03-10 PROCEDURE — 71046 X-RAY EXAM CHEST 2 VIEWS: CPT

## 2023-03-10 PROCEDURE — C1896: CPT

## 2023-03-10 PROCEDURE — 86905 BLOOD TYPING RBC ANTIGENS: CPT

## 2023-03-10 PROCEDURE — 86900 BLOOD TYPING SEROLOGIC ABO: CPT

## 2023-03-20 ENCOUNTER — APPOINTMENT (OUTPATIENT)
Dept: ELECTROPHYSIOLOGY | Facility: CLINIC | Age: 28
End: 2023-03-20
Payer: MEDICAID

## 2023-03-20 ENCOUNTER — NON-APPOINTMENT (OUTPATIENT)
Age: 28
End: 2023-03-20

## 2023-03-20 VITALS
HEART RATE: 69 BPM | HEIGHT: 63 IN | OXYGEN SATURATION: 100 % | WEIGHT: 190 LBS | SYSTOLIC BLOOD PRESSURE: 106 MMHG | BODY MASS INDEX: 33.66 KG/M2 | DIASTOLIC BLOOD PRESSURE: 72 MMHG

## 2023-03-20 PROCEDURE — 99024 POSTOP FOLLOW-UP VISIT: CPT

## 2023-03-20 PROCEDURE — 93000 ELECTROCARDIOGRAM COMPLETE: CPT | Mod: 59

## 2023-03-20 PROCEDURE — 93261 INTERROGATE SUBQ DEFIB: CPT

## 2023-06-07 ENCOUNTER — APPOINTMENT (OUTPATIENT)
Dept: CARDIOLOGY | Facility: CLINIC | Age: 28
End: 2023-06-07
Payer: MEDICAID

## 2023-06-07 VITALS
HEART RATE: 67 BPM | DIASTOLIC BLOOD PRESSURE: 80 MMHG | OXYGEN SATURATION: 98 % | WEIGHT: 182 LBS | SYSTOLIC BLOOD PRESSURE: 110 MMHG | HEIGHT: 63 IN | BODY MASS INDEX: 32.25 KG/M2

## 2023-06-07 DIAGNOSIS — I49.3 VENTRICULAR PREMATURE DEPOLARIZATION: ICD-10-CM

## 2023-06-07 PROCEDURE — 99213 OFFICE O/P EST LOW 20 MIN: CPT

## 2023-06-07 NOTE — PHYSICAL EXAM
Applied bacitracin, covered with telfa gauze followed by tube gauze.   [Well Developed] : well developed [Well Nourished] : well nourished [No Acute Distress] : no acute distress [Normal Conjunctiva] : normal conjunctiva [Normal Venous Pressure] : normal venous pressure [No Carotid Bruit] : no carotid bruit [Normal S1, S2] : normal S1, S2 [No Murmur] : no murmur [No Rub] : no rub [No Gallop] : no gallop [Clear Lung Fields] : clear lung fields [Good Air Entry] : good air entry [No Respiratory Distress] : no respiratory distress  [Soft] : abdomen soft [Non Tender] : non-tender [No Masses/organomegaly] : no masses/organomegaly [Normal Bowel Sounds] : normal bowel sounds [Normal Gait] : normal gait [No Edema] : no edema [No Cyanosis] : no cyanosis [No Clubbing] : no clubbing [No Varicosities] : no varicosities [No Rash] : no rash [No Skin Lesions] : no skin lesions [Moves all extremities] : moves all extremities [No Focal Deficits] : no focal deficits [Normal Speech] : normal speech [Alert and Oriented] : alert and oriented [Normal memory] : normal memory [de-identified] : well-healed left lateral chest incision

## 2023-06-07 NOTE — HISTORY OF PRESENT ILLNESS
[FreeTextEntry1] : Here for follow up.\par Feels well.\par Status post subcutaneous ICD.\par No new complaints.\par Saw Dr Vences for genetics eval and had positive genetic testing for ARVC.\par

## 2023-06-07 NOTE — DISCUSSION/SUMMARY
[FreeTextEntry1] : Contin current meds.\par Referred to Advanced Heart Failure team for management and follow up of right ventricular dysfunction.\par May progress activities to mild to moderate exercise\par Follow up 6 mo.

## 2023-07-02 ENCOUNTER — RESULT CHARGE (OUTPATIENT)
Age: 28
End: 2023-07-02

## 2023-07-03 ENCOUNTER — APPOINTMENT (OUTPATIENT)
Dept: ELECTROPHYSIOLOGY | Facility: CLINIC | Age: 28
End: 2023-07-03
Payer: MEDICAID

## 2023-07-03 ENCOUNTER — NON-APPOINTMENT (OUTPATIENT)
Age: 28
End: 2023-07-03

## 2023-07-03 VITALS
SYSTOLIC BLOOD PRESSURE: 115 MMHG | DIASTOLIC BLOOD PRESSURE: 78 MMHG | WEIGHT: 185 LBS | HEIGHT: 63 IN | BODY MASS INDEX: 32.78 KG/M2 | OXYGEN SATURATION: 97 % | HEART RATE: 64 BPM

## 2023-07-03 PROCEDURE — 93261 INTERROGATE SUBQ DEFIB: CPT

## 2023-07-03 PROCEDURE — 93000 ELECTROCARDIOGRAM COMPLETE: CPT | Mod: 59

## 2023-07-10 RX ORDER — SPIRONOLACTONE 50 MG/1
50 TABLET ORAL
Qty: 1 | Refills: 3 | Status: DISCONTINUED | COMMUNITY
Start: 2022-11-02 | End: 2023-07-10

## 2023-07-11 ENCOUNTER — APPOINTMENT (OUTPATIENT)
Dept: HEART FAILURE | Facility: CLINIC | Age: 28
End: 2023-07-11
Payer: MEDICAID

## 2023-07-11 VITALS
HEART RATE: 78 BPM | WEIGHT: 187 LBS | HEIGHT: 63 IN | BODY MASS INDEX: 33.13 KG/M2 | TEMPERATURE: 97.4 F | DIASTOLIC BLOOD PRESSURE: 74 MMHG | SYSTOLIC BLOOD PRESSURE: 112 MMHG | OXYGEN SATURATION: 100 %

## 2023-07-11 DIAGNOSIS — R06.09 OTHER FORMS OF DYSPNEA: ICD-10-CM

## 2023-07-11 PROCEDURE — 99203 OFFICE O/P NEW LOW 30 MIN: CPT

## 2023-07-12 ENCOUNTER — NON-APPOINTMENT (OUTPATIENT)
Age: 28
End: 2023-07-12

## 2023-07-20 PROBLEM — R06.09 DOE (DYSPNEA ON EXERTION): Status: ACTIVE | Noted: 2023-07-20

## 2023-07-31 LAB
ANION GAP SERPL CALC-SCNC: 13 MMOL/L
ANION GAP SERPL CALC-SCNC: 14 MMOL/L
BUN SERPL-MCNC: 10 MG/DL
BUN SERPL-MCNC: 10 MG/DL
CALCIUM SERPL-MCNC: 9.4 MG/DL
CALCIUM SERPL-MCNC: 9.5 MG/DL
CHLORIDE SERPL-SCNC: 104 MMOL/L
CHLORIDE SERPL-SCNC: 107 MMOL/L
CO2 SERPL-SCNC: 20 MMOL/L
CO2 SERPL-SCNC: 24 MMOL/L
CREAT SERPL-MCNC: 0.69 MG/DL
CREAT SERPL-MCNC: 0.85 MG/DL
EGFR: 122 ML/MIN/1.73M2
EGFR: 96 ML/MIN/1.73M2
GLUCOSE SERPL-MCNC: 106 MG/DL
GLUCOSE SERPL-MCNC: 75 MG/DL
NT-PROBNP SERPL-MCNC: 731 PG/ML
NT-PROBNP SERPL-MCNC: 845 PG/ML
POTASSIUM SERPL-SCNC: 3.9 MMOL/L
POTASSIUM SERPL-SCNC: 4.7 MMOL/L
SODIUM SERPL-SCNC: 138 MMOL/L
SODIUM SERPL-SCNC: 144 MMOL/L

## 2023-08-01 ENCOUNTER — APPOINTMENT (OUTPATIENT)
Dept: CARDIOLOGY | Facility: CLINIC | Age: 28
End: 2023-08-01
Payer: MEDICAID

## 2023-08-01 PROCEDURE — 99213 OFFICE O/P EST LOW 20 MIN: CPT | Mod: 25

## 2023-08-01 PROCEDURE — 93000 ELECTROCARDIOGRAM COMPLETE: CPT | Mod: NC

## 2023-08-04 NOTE — CARDIOLOGY SUMMARY
[de-identified] : 9/15/2022 TTE: LVEF 60%, LVIDd 4.3cm, septum 1/PWT 1, normal RV size with RVSD, no MR, min TR, nl AV, no PFO [de-identified] : 12/2022 Coronary CTA: normal cors\par \par 9/27/2022 cMRI: motion artifact, LVEF 59%, LVEDVi 77, dilated RV with mild RVSD, RVEF 43%, RVEDVi 102, no LGE in LV or RV, no valvular abnormalities [de-identified] : 3/20/2023 ICD report: no tachyarrythmias\par 3/8/2023 subcutaneous ICD implanted\par

## 2023-08-04 NOTE — HISTORY OF PRESENT ILLNESS
[FreeTextEntry1] : Ms. Hernandez is a 27 y/o F with a dilated RV with RVSD, found to have ARVC on genetic testing who presents today for initial consultation for further evaluation of her cardiomyopathy, referred by Dr. Sina Reese.   HPI began in 5/2022 when she was walking to the bus stop and developed palpitations and synopsized. She was evaluated by her PMD and was found to have frequent PVCs noted on EKG. 8/2022 she did an exercise stress test which was negative for exercise induced ischemic changes but did show frequent PVCs during rest, stress, and recovery. Echocardiogram done in 9/2022 showed normal LV function but was notable for RVSD. She subsequently had a cardiac MRI which showed a dilated RV and mild RVSD without LGE and normal LV function. She was seen by Dr. Denis Vences, cardiogenetics, and had genetic testing done which showed + ARVC. She notes her father has a history of heart disease, but she is unaware of the specific details. She was started on losartan and Toprol XL. and underwent subcutaneous ICD on 3/8/2023.  She reports recently experiencing GUTIÉRREZ with walking, which has been gradually worsening but is not liming her in her daily activity. She also reports occasional palpations while exercising. She denies CP, lightheadedness, further episodes of presyncope/syncope, abdominal distention and LE edema. Her device has not fired. She's never been hospitalized for HF.

## 2023-08-04 NOTE — DISCUSSION/SUMMARY
[FreeTextEntry1] : Ms. Hernandez is a 29 y/o F with a dilated RV with RVSD, found to have ARVC on genetic testing who presents today for initial consultation for her cardiomyopathy. She is s/p subcutaneous ICD. She is currently Stage C, NYHA Class II, euvolemic and normotensive. \par \par # RVSD\par - Will increase Toprol XL to 50mg daily\par - Start spironolactone 25mg daily\par - Continue losartan 25mg daily\par - Will repeat labs today and then in 2 weeks to reassess renal function and potassium\par - Counciled on contraception and potential teratogenic effects of medications. Advised to avoid pregnancy at this time and to notify us should she become pregnant.\par - Repeat TTE in 6 months. Should LV function decline would change losartan to ARNI and aggressively uptitrate to max GDMT\par - Continue to follow with Dr. Reese. \par \par #ARVC\par - confirmed with genetic testing\par - s/p subcutaneous ICD. Following with Dr. Powell. \par \par Follow up with Dr. Clarke in 6 months.

## 2023-08-04 NOTE — PHYSICAL EXAM
[No Xanthelasma] : no xanthelasma [Normal Radial B/L] : normal radial B/L [Normal] : alert and oriented, normal memory [de-identified] : JVP 6cm H2O

## 2023-08-17 ENCOUNTER — RX RENEWAL (OUTPATIENT)
Age: 28
End: 2023-08-17

## 2023-08-19 ENCOUNTER — APPOINTMENT (OUTPATIENT)
Dept: ELECTROPHYSIOLOGY | Facility: CLINIC | Age: 28
End: 2023-08-19
Payer: MEDICAID

## 2023-08-19 ENCOUNTER — NON-APPOINTMENT (OUTPATIENT)
Age: 28
End: 2023-08-19

## 2023-08-19 PROCEDURE — 93261 INTERROGATE SUBQ DEFIB: CPT

## 2023-09-22 NOTE — DISCHARGE NOTE NURSING/CASE MANAGEMENT/SOCIAL WORK - NSDCPEPT PROEDMA_GEN_ALL_CORE
General Sunscreen Counseling: I recommended a broad spectrum sunscreen with a SPF of 30 or higher. I explained that SPF 30 sunscreens block approximately 97 percent of the sun's harmful rays.  Sunscreens should be applied at least 15 minutes prior to expected sun exposure and then every 2 hours after that as long as sun exposure continues. If swimming or exercising sunscreen should be reapplied every 45 minutes to an hour after getting wet or sweating. I also recommended a lip balm with a sunscreen as well. Detail Level: Zone Yes

## 2023-10-03 ENCOUNTER — APPOINTMENT (OUTPATIENT)
Dept: ELECTROPHYSIOLOGY | Facility: CLINIC | Age: 28
End: 2023-10-03

## 2023-10-10 ENCOUNTER — APPOINTMENT (OUTPATIENT)
Dept: GASTROENTEROLOGY | Facility: CLINIC | Age: 28
End: 2023-10-10
Payer: MEDICAID

## 2023-10-10 VITALS
WEIGHT: 180 LBS | OXYGEN SATURATION: 98 % | SYSTOLIC BLOOD PRESSURE: 107 MMHG | HEART RATE: 73 BPM | DIASTOLIC BLOOD PRESSURE: 72 MMHG | HEIGHT: 63 IN | BODY MASS INDEX: 31.89 KG/M2

## 2023-10-10 DIAGNOSIS — K92.1 MELENA: ICD-10-CM

## 2023-10-10 DIAGNOSIS — K59.00 CONSTIPATION, UNSPECIFIED: ICD-10-CM

## 2023-10-10 PROCEDURE — 99204 OFFICE O/P NEW MOD 45 MIN: CPT

## 2023-10-10 RX ORDER — POLYETHYLENE GLYCOL 3350 17 G/17G
17 POWDER, FOR SOLUTION ORAL DAILY
Qty: 510 | Refills: 3 | Status: ACTIVE | COMMUNITY
Start: 2023-10-10 | End: 1900-01-01

## 2023-10-10 RX ORDER — POLYETHYLENE GLYCOL-3350 AND ELECTROLYTES 236; 6.74; 5.86; 2.97; 22.74 G/274.31G; G/274.31G; G/274.31G; G/274.31G; G/274.31G
236 POWDER, FOR SOLUTION ORAL
Qty: 1 | Refills: 0 | Status: ACTIVE | COMMUNITY
Start: 2023-10-10 | End: 1900-01-01

## 2023-11-21 ENCOUNTER — APPOINTMENT (OUTPATIENT)
Dept: ELECTROPHYSIOLOGY | Facility: CLINIC | Age: 28
End: 2023-11-21
Payer: MEDICAID

## 2023-11-21 ENCOUNTER — NON-APPOINTMENT (OUTPATIENT)
Age: 28
End: 2023-11-21

## 2023-11-21 PROCEDURE — 93295 DEV INTERROG REMOTE 1/2/MLT: CPT

## 2023-11-21 PROCEDURE — 93296 REM INTERROG EVL PM/IDS: CPT

## 2023-12-14 ENCOUNTER — APPOINTMENT (OUTPATIENT)
Dept: CARDIOLOGY | Facility: CLINIC | Age: 28
End: 2023-12-14
Payer: MEDICAID

## 2023-12-14 VITALS
BODY MASS INDEX: 33.31 KG/M2 | HEIGHT: 63 IN | DIASTOLIC BLOOD PRESSURE: 70 MMHG | SYSTOLIC BLOOD PRESSURE: 110 MMHG | WEIGHT: 188 LBS | OXYGEN SATURATION: 98 % | HEART RATE: 88 BPM

## 2023-12-14 PROCEDURE — 36415 COLL VENOUS BLD VENIPUNCTURE: CPT

## 2023-12-14 PROCEDURE — 99214 OFFICE O/P EST MOD 30 MIN: CPT | Mod: 25

## 2023-12-14 NOTE — DISCUSSION/SUMMARY
[FreeTextEntry1] : Advised stay on inc dose of spironolactone 50 mg QD. Sched echo. Salt restriction. Heart failure Dr Clarke follow up recommended. ICD follow up at [facility]. Follow up 3 weeks. Check labs incl BNP

## 2023-12-14 NOTE — HISTORY OF PRESENT ILLNESS
[FreeTextEntry1] : Here for follow up. Compl of left pedal edema for 1 week, intermittently.  She self increased spironolactone to 100 mg QD which has helped. She cut down on salt. Swelling is partially dependent. She also reports some dyspnea on exertion. + abd distention.

## 2023-12-14 NOTE — PHYSICAL EXAM

## 2023-12-18 LAB
ALBUMIN SERPL ELPH-MCNC: 3.8 G/DL
ALP BLD-CCNC: 62 U/L
ALT SERPL-CCNC: 53 U/L
ANION GAP SERPL CALC-SCNC: 11 MMOL/L
AST SERPL-CCNC: 29 U/L
BILIRUB SERPL-MCNC: 0.3 MG/DL
BUN SERPL-MCNC: 6 MG/DL
CALCIUM SERPL-MCNC: 8.7 MG/DL
CHLORIDE SERPL-SCNC: 105 MMOL/L
CO2 SERPL-SCNC: 20 MMOL/L
CREAT SERPL-MCNC: 0.73 MG/DL
EGFR: 116 ML/MIN/1.73M2
GLUCOSE SERPL-MCNC: 107 MG/DL
NT-PROBNP SERPL-MCNC: 2338 PG/ML
POTASSIUM SERPL-SCNC: 4.5 MMOL/L
PROT SERPL-MCNC: 6.3 G/DL
SODIUM SERPL-SCNC: 135 MMOL/L

## 2023-12-22 ENCOUNTER — OUTPATIENT (OUTPATIENT)
Dept: OUTPATIENT SERVICES | Facility: HOSPITAL | Age: 28
LOS: 1 days | End: 2023-12-22
Payer: MEDICAID

## 2023-12-22 VITALS
RESPIRATION RATE: 15 BRPM | HEART RATE: 83 BPM | DIASTOLIC BLOOD PRESSURE: 69 MMHG | TEMPERATURE: 98 F | OXYGEN SATURATION: 97 % | WEIGHT: 182.1 LBS | SYSTOLIC BLOOD PRESSURE: 111 MMHG | HEIGHT: 63 IN

## 2023-12-22 DIAGNOSIS — K92.1 MELENA: ICD-10-CM

## 2023-12-22 DIAGNOSIS — Z95.810 PRESENCE OF AUTOMATIC (IMPLANTABLE) CARDIAC DEFIBRILLATOR: Chronic | ICD-10-CM

## 2023-12-22 DIAGNOSIS — Z01.818 ENCOUNTER FOR OTHER PREPROCEDURAL EXAMINATION: ICD-10-CM

## 2023-12-22 DIAGNOSIS — I42.8 OTHER CARDIOMYOPATHIES: ICD-10-CM

## 2023-12-22 LAB
ANION GAP SERPL CALC-SCNC: 10 MMOL/L — SIGNIFICANT CHANGE UP (ref 5–17)
ANION GAP SERPL CALC-SCNC: 10 MMOL/L — SIGNIFICANT CHANGE UP (ref 5–17)
BUN SERPL-MCNC: 12 MG/DL — SIGNIFICANT CHANGE UP (ref 7–23)
BUN SERPL-MCNC: 12 MG/DL — SIGNIFICANT CHANGE UP (ref 7–23)
CALCIUM SERPL-MCNC: 9.5 MG/DL — SIGNIFICANT CHANGE UP (ref 8.4–10.5)
CALCIUM SERPL-MCNC: 9.5 MG/DL — SIGNIFICANT CHANGE UP (ref 8.4–10.5)
CHLORIDE SERPL-SCNC: 104 MMOL/L — SIGNIFICANT CHANGE UP (ref 96–108)
CHLORIDE SERPL-SCNC: 104 MMOL/L — SIGNIFICANT CHANGE UP (ref 96–108)
CO2 SERPL-SCNC: 23 MMOL/L — SIGNIFICANT CHANGE UP (ref 22–31)
CO2 SERPL-SCNC: 23 MMOL/L — SIGNIFICANT CHANGE UP (ref 22–31)
CREAT SERPL-MCNC: 0.63 MG/DL — SIGNIFICANT CHANGE UP (ref 0.5–1.3)
CREAT SERPL-MCNC: 0.63 MG/DL — SIGNIFICANT CHANGE UP (ref 0.5–1.3)
EGFR: 125 ML/MIN/1.73M2 — SIGNIFICANT CHANGE UP
EGFR: 125 ML/MIN/1.73M2 — SIGNIFICANT CHANGE UP
GLUCOSE SERPL-MCNC: 154 MG/DL — HIGH (ref 70–99)
GLUCOSE SERPL-MCNC: 154 MG/DL — HIGH (ref 70–99)
HCT VFR BLD CALC: 45.5 % — HIGH (ref 34.5–45)
HCT VFR BLD CALC: 45.5 % — HIGH (ref 34.5–45)
HGB BLD-MCNC: 15 G/DL — SIGNIFICANT CHANGE UP (ref 11.5–15.5)
HGB BLD-MCNC: 15 G/DL — SIGNIFICANT CHANGE UP (ref 11.5–15.5)
MCHC RBC-ENTMCNC: 29.9 PG — SIGNIFICANT CHANGE UP (ref 27–34)
MCHC RBC-ENTMCNC: 29.9 PG — SIGNIFICANT CHANGE UP (ref 27–34)
MCHC RBC-ENTMCNC: 33 GM/DL — SIGNIFICANT CHANGE UP (ref 32–36)
MCHC RBC-ENTMCNC: 33 GM/DL — SIGNIFICANT CHANGE UP (ref 32–36)
MCV RBC AUTO: 90.8 FL — SIGNIFICANT CHANGE UP (ref 80–100)
MCV RBC AUTO: 90.8 FL — SIGNIFICANT CHANGE UP (ref 80–100)
NRBC # BLD: 0 /100 WBCS — SIGNIFICANT CHANGE UP (ref 0–0)
NRBC # BLD: 0 /100 WBCS — SIGNIFICANT CHANGE UP (ref 0–0)
PLATELET # BLD AUTO: 309 K/UL — SIGNIFICANT CHANGE UP (ref 150–400)
PLATELET # BLD AUTO: 309 K/UL — SIGNIFICANT CHANGE UP (ref 150–400)
POTASSIUM SERPL-MCNC: 4.3 MMOL/L — SIGNIFICANT CHANGE UP (ref 3.5–5.3)
POTASSIUM SERPL-MCNC: 4.3 MMOL/L — SIGNIFICANT CHANGE UP (ref 3.5–5.3)
POTASSIUM SERPL-SCNC: 4.3 MMOL/L — SIGNIFICANT CHANGE UP (ref 3.5–5.3)
POTASSIUM SERPL-SCNC: 4.3 MMOL/L — SIGNIFICANT CHANGE UP (ref 3.5–5.3)
RBC # BLD: 5.01 M/UL — SIGNIFICANT CHANGE UP (ref 3.8–5.2)
RBC # BLD: 5.01 M/UL — SIGNIFICANT CHANGE UP (ref 3.8–5.2)
RBC # FLD: 13.9 % — SIGNIFICANT CHANGE UP (ref 10.3–14.5)
RBC # FLD: 13.9 % — SIGNIFICANT CHANGE UP (ref 10.3–14.5)
SODIUM SERPL-SCNC: 137 MMOL/L — SIGNIFICANT CHANGE UP (ref 135–145)
SODIUM SERPL-SCNC: 137 MMOL/L — SIGNIFICANT CHANGE UP (ref 135–145)
WBC # BLD: 7.58 K/UL — SIGNIFICANT CHANGE UP (ref 3.8–10.5)
WBC # BLD: 7.58 K/UL — SIGNIFICANT CHANGE UP (ref 3.8–10.5)
WBC # FLD AUTO: 7.58 K/UL — SIGNIFICANT CHANGE UP (ref 3.8–10.5)
WBC # FLD AUTO: 7.58 K/UL — SIGNIFICANT CHANGE UP (ref 3.8–10.5)

## 2023-12-22 PROCEDURE — 80048 BASIC METABOLIC PNL TOTAL CA: CPT

## 2023-12-22 PROCEDURE — 36415 COLL VENOUS BLD VENIPUNCTURE: CPT

## 2023-12-22 PROCEDURE — G0463: CPT

## 2023-12-22 PROCEDURE — 85027 COMPLETE CBC AUTOMATED: CPT

## 2023-12-22 RX ORDER — METOPROLOL TARTRATE 50 MG
1 TABLET ORAL
Qty: 0 | Refills: 0 | DISCHARGE

## 2023-12-22 NOTE — H&P PST ADULT - ASSESSMENT
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

## 2023-12-22 NOTE — H&P PST ADULT - PROBLEM SELECTOR PLAN 1
Scheduled for colonoscopy on 1/12/2023  Preop instructions given, copy sent home with patient; to follow GI instructions re: cleanse preop

## 2023-12-22 NOTE — H&P PST ADULT - CARDIOVASCULAR
details… occasional premature beats/regular rate and rhythm/S1 S2 present/no rub/no murmur/no pedal edema

## 2023-12-22 NOTE — H&P PST ADULT - HISTORY OF PRESENT ILLNESS
Ms. 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

## 2023-12-22 NOTE — H&P PST ADULT - PAIN SITE
Anesthesia Pre-Procedure Evaluation    Patient: Ivon Sheets   MRN: [id number] : 1969        Procedure : Procedure(s):  OPEN REDUCTION INTERNAL FIXATION LEFT PROXIMAL HUMERUS FRACTURE          Past Medical History:   Diagnosis Date     Alcohol dependence (H)      Depression      Fall from slipping on ice 2022     Fracture of left humerus 2022     Hypertension      Hyponatremia      Polysubstance (excluding opioids) dependence, daily use (H)     marijuana use     Seizures (H)      Seizures (H)      Takotsubo cardiomyopathy      Tobacco use disorder      Traumatic hemorrhage of right cerebrum (H)     with loss od consciousness of 30mins or less     Vitamin D deficiency       Past Surgical History:   Procedure Laterality Date     EYE SURGERY        No Known Allergies   Social History     Tobacco Use     Smoking status: Current Every Day Smoker     Packs/day: 1.00     Years: 26.00     Pack years: 26.00     Types: Cigarettes     Smokeless tobacco: Never Used   Substance Use Topics     Alcohol use: Yes     Alcohol/week: 4.0 standard drinks     Types: 4 Cans of beer per week     Comment: Alcoholic Drinks/day: 3-4 beers/day      Wt Readings from Last 1 Encounters:   21 45.4 kg (100 lb)        Anesthesia Evaluation            ROS/MED HX  ENT/Pulmonary:     (+) tobacco use, Current use,  (-) sleep apnea   Neurologic:     (+) seizures, CVA,     Cardiovascular:     (+) hypertension-----CHF etiology: Takotsubo cardiomyopathy;     METS/Exercise Tolerance:     Hematologic:       Musculoskeletal:       GI/Hepatic:     (+) GERD,     Renal/Genitourinary:       Endo:       Psychiatric/Substance Use:     (+) psychiatric history depression alcohol abuse Recreational drug usage: Cannabis.    Infectious Disease: Comment: Tested Covid+ in pre-op;      Malignancy:       Other:            Physical Exam    Airway        Mallampati: II   TM distance: > 3 FB   Neck ROM: full   Mouth opening: > 3 cm    Respiratory  Devices and Support         Dental  no notable dental history         Cardiovascular   cardiovascular exam normal          Pulmonary   pulmonary exam normal                OUTSIDE LABS:  CBC:   Lab Results   Component Value Date    WBC 6.9 09/09/2021    WBC 8.6 09/07/2021    HGB 11.7 09/09/2021    HGB 12.2 09/07/2021    HCT 34.2 (L) 09/09/2021    HCT 34.1 (L) 09/07/2021     (L) 09/09/2021     (L) 09/07/2021     BMP:   Lab Results   Component Value Date     (L) 09/09/2021     (L) 09/09/2021    POTASSIUM 3.3 (L) 09/09/2021    POTASSIUM 3.4 09/08/2021    CHLORIDE 93 (L) 09/09/2021    CHLORIDE 87 (L) 09/07/2021    CO2 19 (L) 09/09/2021    CO2 19 (L) 09/07/2021    BUN 4 (L) 09/09/2021    BUN 3 (L) 09/07/2021    CR 0.41 (L) 09/09/2021    CR 0.42 (L) 09/07/2021    GLC 74 09/09/2021     (H) 09/07/2021     COAGS:   Lab Results   Component Value Date    PTT 33 06/02/2020    INR 0.92 06/02/2020     POC:   Lab Results   Component Value Date    HCGS Negative 06/02/2020     HEPATIC:   Lab Results   Component Value Date    ALBUMIN 4.0 09/07/2021    PROTTOTAL 7.4 09/07/2021    ALT 60 (H) 09/07/2021    AST 93 (H) 09/07/2021    ALKPHOS 93 09/07/2021    BILITOTAL 0.8 09/07/2021     OTHER:   Lab Results   Component Value Date    PH 7.48 (H) 06/02/2020    LACT 0.6 06/04/2020    CLAUDIA 9.1 09/09/2021    PHOS 2.3 (L) 09/07/2021    MAG 1.7 09/07/2021    LIPASE 43 10/29/2018    TSH 2.12 06/03/2020    SED 5 02/21/2018       Anesthesia Plan    ASA Status:  3   NPO Status:  NPO Appropriate    Anesthesia Type: General.     - Airway: ETT   Induction: Intravenous.   Maintenance: Balanced.        Consents    Anesthesia Plan(s) and associated risks, benefits, and realistic alternatives discussed. Questions answered and patient/representative(s) expressed understanding.    - Discussed:     - Discussed with:  Patient         Postoperative Care    Pain management: IV analgesics, Peripheral nerve block (Single Shot).    PONV prophylaxis: Ondansetron (or other 5HT-3)     Comments:                JOSE HERRERA MD   na

## 2023-12-22 NOTE — H&P PST ADULT - NSICDXPASTMEDICALHX_GEN_ALL_CORE_FT
PAST MEDICAL HISTORY:  Anxiety and depression     Arrhythmogenic right ventricular dysplasia     Dyspnea on exertion     H/O acne     H/O eczema     H/O female dyspareunia     H/O low back pain     Intertrigo     Nonsustained paroxysmal ventricular tachycardia     PVC (premature ventricular contraction)

## 2023-12-22 NOTE — H&P PST ADULT - PROBLEM SELECTOR PLAN 2
Last cardio note on chart from 12/14/2023  Last interrogation of ICD 11/21/2023 in chart  Instructed to continue all medications as prescribed, to take am doses with enough water to swallow pills the DOS

## 2023-12-22 NOTE — H&P PST ADULT - PRIMARY CARE PROVIDER
Dr. Radha Coelho # [phone]; Cardiologist: Dr. Sina Reese # [phone] Dr. Radha Coelho # [phone]; Cardiologist: Dr. Sina Reese # [phone]

## 2024-01-12 ENCOUNTER — TRANSCRIPTION ENCOUNTER (OUTPATIENT)
Age: 29
End: 2024-01-12

## 2024-01-12 ENCOUNTER — OUTPATIENT (OUTPATIENT)
Dept: OUTPATIENT SERVICES | Facility: HOSPITAL | Age: 29
LOS: 1 days | End: 2024-01-12
Payer: MEDICAID

## 2024-01-12 ENCOUNTER — APPOINTMENT (OUTPATIENT)
Dept: GASTROENTEROLOGY | Facility: HOSPITAL | Age: 29
End: 2024-01-12

## 2024-01-12 VITALS
DIASTOLIC BLOOD PRESSURE: 69 MMHG | WEIGHT: 179.9 LBS | HEART RATE: 66 BPM | RESPIRATION RATE: 18 BRPM | OXYGEN SATURATION: 100 % | TEMPERATURE: 97 F | SYSTOLIC BLOOD PRESSURE: 119 MMHG | HEIGHT: 63 IN

## 2024-01-12 VITALS
RESPIRATION RATE: 15 BRPM | SYSTOLIC BLOOD PRESSURE: 109 MMHG | HEART RATE: 62 BPM | OXYGEN SATURATION: 99 % | DIASTOLIC BLOOD PRESSURE: 70 MMHG

## 2024-01-12 VITALS
OXYGEN SATURATION: 97 % | HEIGHT: 63 IN | HEART RATE: 83 BPM | TEMPERATURE: 98 F | DIASTOLIC BLOOD PRESSURE: 69 MMHG | RESPIRATION RATE: 15 BRPM | SYSTOLIC BLOOD PRESSURE: 111 MMHG | WEIGHT: 182.1 LBS

## 2024-01-12 DIAGNOSIS — Z95.810 PRESENCE OF AUTOMATIC (IMPLANTABLE) CARDIAC DEFIBRILLATOR: Chronic | ICD-10-CM

## 2024-01-12 DIAGNOSIS — K92.1 MELENA: ICD-10-CM

## 2024-01-12 LAB
HCG SERPL-ACNC: <2 MIU/ML — SIGNIFICANT CHANGE UP
HCG SERPL-ACNC: <2 MIU/ML — SIGNIFICANT CHANGE UP

## 2024-01-12 PROCEDURE — 45378 DIAGNOSTIC COLONOSCOPY: CPT

## 2024-01-12 PROCEDURE — 84702 CHORIONIC GONADOTROPIN TEST: CPT

## 2024-01-12 DEVICE — NET RETRV ROT ROTH 2.5MMX230CM: Type: IMPLANTABLE DEVICE | Status: FUNCTIONAL

## 2024-01-12 RX ORDER — SERTRALINE 25 MG/1
1 TABLET, FILM COATED ORAL
Refills: 0 | DISCHARGE

## 2024-01-12 RX ORDER — SPIRONOLACTONE 25 MG/1
1 TABLET, FILM COATED ORAL
Qty: 0 | Refills: 0 | DISCHARGE

## 2024-01-12 RX ORDER — LOSARTAN POTASSIUM 100 MG/1
1 TABLET, FILM COATED ORAL
Refills: 0 | DISCHARGE

## 2024-01-12 RX ORDER — POLYETHYLENE GLYCOL 3350 17 G/17G
17 POWDER, FOR SOLUTION ORAL
Refills: 0 | DISCHARGE

## 2024-01-12 RX ORDER — SODIUM CHLORIDE 9 MG/ML
500 INJECTION INTRAMUSCULAR; INTRAVENOUS; SUBCUTANEOUS
Refills: 0 | Status: DISCONTINUED | OUTPATIENT
Start: 2024-01-12 | End: 2024-01-26

## 2024-01-12 NOTE — ASU DISCHARGE PLAN (ADULT/PEDIATRIC) - NS MD DC FALL RISK RISK
For information on Fall & Injury Prevention, visit: [email or web address] OR  [email or web address] [email or web address] OR  https://www.cdc.gov/steadi/patient.html For information on Fall & Injury Prevention, visit: [email or web address] OR  [email or web address] OR  https://www.cdc.gov/steadi/patient.html

## 2024-01-12 NOTE — PRE PROCEDURE NOTE - PRE PROCEDURE EVALUATION
Attending Physician:   Asael Rivers                         Procedure: colonoscopy\  Indication for Procedure: hematochezia, constipation     ________________________________________________________  PAST MEDICAL & SURGICAL HISTORY:  Arrhythmogenic right ventricular dysplasia      PVC (premature ventricular contraction)      H/O low back pain      H/O acne      Anxiety and depression      H/O female dyspareunia      Dyspnea on exertion      H/O eczema      Intertrigo      Nonsustained paroxysmal ventricular tachycardia      History of implantable cardiac defibrillator (ICD)        ALLERGIES:  No Known Allergies    HOME MEDICATIONS:  losartan 25 mg oral tablet: 1 tab(s) orally once a day  Multiple Vitamins oral tablet: 1 tab(s) orally once a day  polyethylene glycol 3350 oral kit: orally once a day  spironolactone 25 mg oral tablet: 1 tab(s) orally once a day  for acne  Zoloft 25 mg oral tablet: 1 tab(s) orally 2 times a day as needed for  anxiety  Zoloft 50 mg oral tablet: 1 tab(s) orally 2 times a day as needed for  anxiety    AICD/PPM: [x ] yes   [ ] no    PERTINENT LAB DATA:                      PHYSICAL EXAMINATION:    Height (cm): 160  Weight (kg): 81.6  BMI (kg/m2): 31.9  BSA (m2): 1.85T(C): 36.3  HR: 66  BP: 119/69  RR: 18  SpO2: 100%    Constitutional: NAD  HEENT: PERRLA, EOMI,    Neck:  No JVD  Respiratory: CTAB/L  Cardiovascular: S1 and S2  Gastrointestinal: BS+, soft, NT/ND  Extremities: No peripheral edema  Neurological: A/O x 3, no focal deficits  Psychiatric: Normal mood, normal affect  Skin: No rashes    ASA Class: I [ ]  II [ ]  III [x ]  IV [ ]    COMMENTS:    The patient is a suitable candidate for the planned procedure unless box checked [ ]  No, explain:

## 2024-01-12 NOTE — ASU PATIENT PROFILE, ADULT - TEACHING/LEARNING FACTORS IMPACT ABILITY TO LEARN
Plan of care discussed with pt. Patient verbalizes understanding. IVF infusing as ordered. No complaints of nausea, vomiting, or pain. Bed in lowest position, wheels locked. Call light within reach.     none

## 2024-01-12 NOTE — ASU DISCHARGE PLAN (ADULT/PEDIATRIC) - C. MAKE IMPORTANT PERSONAL OR BUSINESS DECISIONS
Labor Analgesia  Date/Time: 7/4/2020 4:21 AM  Performed by: Anabel Bajwa MD  Authorized by: Anabel Bajwa MD       General Information and Staff    Start Time:  7/4/2020 4:10 AM  End Time:  7/4/2020 4:20 AM  Anesthesiologist:  Aparna Reza
Statement Selected

## 2024-01-22 ENCOUNTER — APPOINTMENT (OUTPATIENT)
Dept: CARDIOLOGY | Facility: CLINIC | Age: 29
End: 2024-01-22
Payer: MEDICAID

## 2024-01-22 VITALS
OXYGEN SATURATION: 98 % | HEIGHT: 63 IN | SYSTOLIC BLOOD PRESSURE: 108 MMHG | DIASTOLIC BLOOD PRESSURE: 82 MMHG | HEART RATE: 59 BPM | WEIGHT: 188 LBS | BODY MASS INDEX: 33.31 KG/M2

## 2024-01-22 PROBLEM — Z87.2 PERSONAL HISTORY OF DISEASES OF THE SKIN AND SUBCUTANEOUS TISSUE: Chronic | Status: ACTIVE | Noted: 2023-12-22

## 2024-01-22 PROBLEM — Z87.42 PERSONAL HISTORY OF OTHER DISEASES OF THE FEMALE GENITAL TRACT: Chronic | Status: ACTIVE | Noted: 2023-12-22

## 2024-01-22 PROBLEM — F41.9 ANXIETY DISORDER, UNSPECIFIED: Chronic | Status: ACTIVE | Noted: 2023-12-22

## 2024-01-22 PROBLEM — R06.09 OTHER FORMS OF DYSPNEA: Chronic | Status: ACTIVE | Noted: 2023-12-22

## 2024-01-22 PROBLEM — I47.29 OTHER VENTRICULAR TACHYCARDIA: Chronic | Status: ACTIVE | Noted: 2023-12-22

## 2024-01-22 PROBLEM — L30.4 ERYTHEMA INTERTRIGO: Chronic | Status: ACTIVE | Noted: 2023-12-22

## 2024-01-22 PROCEDURE — 93306 TTE W/DOPPLER COMPLETE: CPT

## 2024-01-22 PROCEDURE — 99213 OFFICE O/P EST LOW 20 MIN: CPT | Mod: 25

## 2024-01-22 RX ORDER — SPIRONOLACTONE 25 MG/1
25 TABLET ORAL
Qty: 90 | Refills: 1 | Status: DISCONTINUED | COMMUNITY
Start: 2023-07-11 | End: 2024-01-22

## 2024-01-22 NOTE — PHYSICAL EXAM
[Well Developed] : well developed [Well Nourished] : well nourished [No Acute Distress] : no acute distress [Normal Conjunctiva] : normal conjunctiva [No Carotid Bruit] : no carotid bruit [Normal Venous Pressure] : normal venous pressure [Normal S1, S2] : normal S1, S2 [No Murmur] : no murmur [No Rub] : no rub [No Gallop] : no gallop [Clear Lung Fields] : clear lung fields [No Respiratory Distress] : no respiratory distress  [Good Air Entry] : good air entry [Soft] : abdomen soft [Non Tender] : non-tender [No Masses/organomegaly] : no masses/organomegaly [Normal Bowel Sounds] : normal bowel sounds [Normal Gait] : normal gait [No Cyanosis] : no cyanosis [No Clubbing] : no clubbing [Edema ___] : edema [unfilled] [No Varicosities] : no varicosities [No Rash] : no rash [Moves all extremities] : moves all extremities [No Skin Lesions] : no skin lesions [No Focal Deficits] : no focal deficits [Normal Speech] : normal speech [Alert and Oriented] : alert and oriented [Normal memory] : normal memory

## 2024-01-22 NOTE — HISTORY OF PRESENT ILLNESS
[FreeTextEntry1] : Here for follow up. Echo was not scheduled. No new complaints.  Feels OK.  Edema has been controlled. No SOB.

## 2024-02-14 RX ORDER — SPIRONOLACTONE 50 MG/1
50 TABLET ORAL DAILY
Qty: 90 | Refills: 1 | Status: ACTIVE | COMMUNITY
Start: 2023-12-14 | End: 1900-01-01

## 2024-02-24 ENCOUNTER — NON-APPOINTMENT (OUTPATIENT)
Age: 29
End: 2024-02-24

## 2024-02-28 ENCOUNTER — APPOINTMENT (OUTPATIENT)
Dept: DERMATOLOGY | Facility: CLINIC | Age: 29
End: 2024-02-28
Payer: MEDICAID

## 2024-02-28 DIAGNOSIS — L81.0 POSTINFLAMMATORY HYPERPIGMENTATION: ICD-10-CM

## 2024-02-28 DIAGNOSIS — L70.9 ACNE, UNSPECIFIED: ICD-10-CM

## 2024-02-28 PROCEDURE — 99214 OFFICE O/P EST MOD 30 MIN: CPT

## 2024-02-28 RX ORDER — TRETINOIN 0.25 MG/G
0.03 CREAM TOPICAL
Qty: 1 | Refills: 6 | Status: ACTIVE | COMMUNITY
Start: 2024-02-28 | End: 1900-01-01

## 2024-02-28 NOTE — HISTORY OF PRESENT ILLNESS
[FreeTextEntry1] : f/u acne [de-identified] : Pt is a 28 year old F presenting for f/u of  LV 11/2022  #Acne on face x years - at LV, prescribed Spironolactone 50 BID and BPO. feels acne has improved, getting less flares around menstruation. endorses picking at acne. Pt has previously tried tretinoin and tazorac, but is currently not using any topicals.  - NOTABLY, pt has hx of heart failure, Spironolactone has been cleared by cardiologist. - not on OCPs, not planning to get pregnant  Derm Hx: eczema, resolved

## 2024-02-28 NOTE — PHYSICAL EXAM
[Alert] : alert [Oriented x 3] : ~L oriented x 3 [Conjunctiva Non-injected] : conjunctiva non-injected [Well Nourished] : well nourished [No Clubbing] : no clubbing [No Visual Lymphadenopathy] : no visual  lymphadenopathy [No Bromhidrosis] : no bromhidrosis [No Edema] : no edema [No Chromhidrosis] : no chromhidrosis [FreeTextEntry3] : hyperpigmented and scarred macules on bl cheeks, jaw and chin open and closed comedones on cheeks, nose, forehead

## 2024-02-28 NOTE — ASSESSMENT
[FreeTextEntry1] : #Acne Vulgaris, mild, hormonal pattern  chronic, flaring w/ #PIH - we have discussed the nature and course of this condition, treatment options and expectations. - c/w Spironolactone 50mg BID. Notably, pt has heart failure - cardiology in agreement with leonila.  risks/benefits/side effects discussed w pt, including frequent urination, lightheadedness; counseled on risks of getting pregnant/associated birth defects while on spironolactone - start tretinoin cream 0.025% 2-3x/week and increase to nightly as tolerated; use pea sized amount all over face at night time; wear SPF 30+ and reapply every 2 hrs as needed; side effects discussed including dryness/flakiness of skin, skin peeling  RTC 6mo

## 2024-02-29 ENCOUNTER — APPOINTMENT (OUTPATIENT)
Dept: ELECTROPHYSIOLOGY | Facility: CLINIC | Age: 29
End: 2024-02-29
Payer: MEDICAID

## 2024-02-29 ENCOUNTER — NON-APPOINTMENT (OUTPATIENT)
Age: 29
End: 2024-02-29

## 2024-02-29 VITALS
BODY MASS INDEX: 33.31 KG/M2 | WEIGHT: 188 LBS | HEIGHT: 63 IN | DIASTOLIC BLOOD PRESSURE: 67 MMHG | HEART RATE: 77 BPM | OXYGEN SATURATION: 99 % | SYSTOLIC BLOOD PRESSURE: 98 MMHG

## 2024-02-29 PROCEDURE — 93000 ELECTROCARDIOGRAM COMPLETE: CPT | Mod: 59

## 2024-02-29 PROCEDURE — 93261 INTERROGATE SUBQ DEFIB: CPT

## 2024-03-29 RX ORDER — SERTRALINE HYDROCHLORIDE 100 MG/1
100 TABLET, FILM COATED ORAL DAILY
Refills: 0 | Status: ACTIVE | COMMUNITY

## 2024-03-29 RX ORDER — ATOMOXETINE 40 MG/1
40 CAPSULE ORAL
Refills: 0 | Status: ACTIVE | COMMUNITY
Start: 2024-02-21

## 2024-04-01 ENCOUNTER — APPOINTMENT (OUTPATIENT)
Dept: HEART FAILURE | Facility: CLINIC | Age: 29
End: 2024-04-01
Payer: MEDICAID

## 2024-04-01 VITALS
SYSTOLIC BLOOD PRESSURE: 129 MMHG | WEIGHT: 174 LBS | HEART RATE: 82 BPM | BODY MASS INDEX: 30.83 KG/M2 | OXYGEN SATURATION: 100 % | HEIGHT: 63 IN | DIASTOLIC BLOOD PRESSURE: 86 MMHG | TEMPERATURE: 97.6 F

## 2024-04-01 DIAGNOSIS — R60.0 LOCALIZED EDEMA: ICD-10-CM

## 2024-04-01 DIAGNOSIS — I51.9 HEART DISEASE, UNSPECIFIED: ICD-10-CM

## 2024-04-01 PROCEDURE — G2211 COMPLEX E/M VISIT ADD ON: CPT | Mod: NC,1L

## 2024-04-01 PROCEDURE — 99214 OFFICE O/P EST MOD 30 MIN: CPT

## 2024-04-01 RX ORDER — SACUBITRIL AND VALSARTAN 24; 26 MG/1; MG/1
24-26 TABLET, FILM COATED ORAL
Qty: 60 | Refills: 3 | Status: ACTIVE | COMMUNITY
Start: 2024-04-01 | End: 1900-01-01

## 2024-04-01 RX ORDER — LOSARTAN POTASSIUM 25 MG/1
25 TABLET, FILM COATED ORAL DAILY
Qty: 90 | Refills: 3 | Status: DISCONTINUED | COMMUNITY
Start: 2023-02-27 | End: 2024-04-01

## 2024-04-01 NOTE — HISTORY OF PRESENT ILLNESS
[FreeTextEntry1] : Ms. 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 She was initially referred by Dr. Sina Reese.   HPI began in 5/2022 when she was walking to the bus stop and developed palpitations and synopsized. She was evaluated by her PMD and was found to have frequent PVCs noted on EKG. 8/2022 she did an exercise stress test which was negative for exercise induced ischemic changes but did show frequent PVCs during rest, stress, and recovery. Echocardiogram done in 9/2022 showed normal LV function but was notable for RVSD. She subsequently had a cardiac MRI which showed a dilated RV and mild RVSD without LGE and normal LV function. She was seen by Dr. Denis Vences, cardiogenetics, and had genetic testing done which showed + ARVC. She notes her father has a history of heart disease, but she is unaware of the specific details. She was started on losartan and Toprol XL. and underwent subcutaneous ICD on 3/8/2023.  She was last seen in 7/2023. In Jan 2024 she had a repeat TTE showing LVEF 58%, normal RV size with RVSD. She notes a few occasions over the past few months where she's developed pedal edema, which improved with escalation of spironolactone and decreasing sodium in the diet. She's working as a  at [employer] and denies SOB but at times has felt lightheaded/dizzy. She reports occasional palpitations. Denies orthopnea, CP, abdominal discomfort.

## 2024-04-01 NOTE — PHYSICAL EXAM
[No Xanthelasma] : no xanthelasma [Normal Radial B/L] : normal radial B/L [Normal] : normal conjunctiva [Normal Venous Pressure] : normal venous pressure [de-identified] : JVP <6cm H2O [de-identified] : warm peripherally

## 2024-04-01 NOTE — DISCUSSION/SUMMARY
[FreeTextEntry1] : Ms. 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 She is s/p subcutaneous ICD. She is currently Stage C and endorsing NYHA Class II symptoms. Most recent labs in December 2023 showed normal renal function and electrolytes but an elevated and uptrending proBNP. She's developed symptoms of fluid retention intermittently over the past few months, although currently is euvolemic. She is normotensive tolerating low dose neurohormonal antagonists.   # RVSD - Will stop losartan and start Entresto 24-26mg BID - Continue Toprol XL to 50mg daily - Continue spironolactone 50mg daily - Will repeat labs today and then in 1 week to reassess renal function and potassium - She was previously counseled on contraception and potential teratogenic effects of medications. Advised to avoid pregnancy at this time and to notify us should she become pregnant. - Repeat cMRI in 3 months to reassess cardiac function and for monitoring of RVSD - Continue to follow with Dr. Reese.   #ARVC - confirmed with genetic testing - s/p subcutaneous ICD, which is MRI compatible. Following with Dr. Powell.   Follow up with Dr. Clarke in 3 months

## 2024-04-01 NOTE — CARDIOLOGY SUMMARY
[de-identified] : 1/22/24 TTE: LVEF 58%, LVIDd 4.4cm, nl RV size with RVSD, TAPSE 2.3, no valvular abnormalities, est PASP 24 mm Hg 9/15/2022 TTE: LVEF 60%, LVIDd 4.3cm, septum 1/PWT 1, normal RV size with RVSD, no MR, min TR, nl AV, no PFO [de-identified] : 12/2022 Coronary CTA: normal cors\par  \par  9/27/2022 cMRI: motion artifact, LVEF 59%, LVEDVi 77, dilated RV with mild RVSD, RVEF 43%, RVEDVi 102, no LGE in LV or RV, no valvular abnormalities [de-identified] : 3/20/2023 ICD report: no tachyarrythmias\par  3/8/2023 subcutaneous ICD implanted\par

## 2024-04-01 NOTE — HISTORY OF PRESENT ILLNESS
[FreeTextEntry1] : Ms. 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 She was initially referred by Dr. Sina Reese.   HPI began in 5/2022 when she was walking to the bus stop and developed palpitations and synopsized. She was evaluated by her PMD and was found to have frequent PVCs noted on EKG. 8/2022 she did an exercise stress test which was negative for exercise induced ischemic changes but did show frequent PVCs during rest, stress, and recovery. Echocardiogram done in 9/2022 showed normal LV function but was notable for RVSD. She subsequently had a cardiac MRI which showed a dilated RV and mild RVSD without LGE and normal LV function. She was seen by Dr. Denis Vences, cardiogenetics, and had genetic testing done which showed + ARVC. She notes her father has a history of heart disease, but she is unaware of the specific details. She was started on losartan and Toprol XL. and underwent subcutaneous ICD on 3/8/2023.  She was last seen in 7/2023. In Jan 2024 she had a repeat TTE showing LVEF 58%, normal RV size with RVSD. She notes a few occasions over the past few months where she's developed pedal edema, which improved with escalation of spironolactone and decreasing sodium in the diet. She's working as a  at [employer] and denies SOB but at times has felt lightheaded/dizzy. She reports occasional palpitations. Denies orthopnea, CP, abdominal discomfort.

## 2024-04-01 NOTE — DISCUSSION/SUMMARY
[FreeTextEntry1] : Ms. 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 She is s/p subcutaneous ICD. She is currently Stage C and endorsing NYHA Class II symptoms. Most recent labs in December 2023 showed normal renal function and electrolytes but an elevated and uptrending proBNP. She's developed symptoms of fluid retention intermittently over the past few months, although currently is euvolemic. She is normotensive tolerating low dose neurohormonal antagonists.   # RVSD - Will stop losartan and start Entresto 24-26mg BID - Continue Toprol XL to 50mg daily - Continue spironolactone 50mg daily - Will repeat labs today and then in 1 week to reassess renal function and potassium - She was previously counseled on contraception and potential teratogenic effects of medications. Advised to avoid pregnancy at this time and to notify us should she become pregnant. - Repeat cMRI in 3 months to reassess cardiac function and for monitoring of RVSD - Continue to follow with Dr. Reese.   #ARVC - confirmed with genetic testing - s/p subcutaneous ICD, which is MRI compatible. Following with Dr. Powell.   Follow up with Dr. Clarke in 3 months Detail Level: Detailed Detail Level: Zone Detail Level: Generalized

## 2024-04-01 NOTE — PHYSICAL EXAM
[No Xanthelasma] : no xanthelasma [Normal Radial B/L] : normal radial B/L [Normal] : normal conjunctiva [Normal Venous Pressure] : normal venous pressure [de-identified] : JVP <6cm H2O [de-identified] : warm peripherally

## 2024-04-01 NOTE — CARDIOLOGY SUMMARY
[de-identified] : 1/22/24 TTE: LVEF 58%, LVIDd 4.4cm, nl RV size with RVSD, TAPSE 2.3, no valvular abnormalities, est PASP 24 mm Hg 9/15/2022 TTE: LVEF 60%, LVIDd 4.3cm, septum 1/PWT 1, normal RV size with RVSD, no MR, min TR, nl AV, no PFO [de-identified] : 12/2022 Coronary CTA: normal cors\par  \par  9/27/2022 cMRI: motion artifact, LVEF 59%, LVEDVi 77, dilated RV with mild RVSD, RVEF 43%, RVEDVi 102, no LGE in LV or RV, no valvular abnormalities [de-identified] : 3/20/2023 ICD report: no tachyarrythmias\par  3/8/2023 subcutaneous ICD implanted\par

## 2024-04-05 ENCOUNTER — NON-APPOINTMENT (OUTPATIENT)
Age: 29
End: 2024-04-05

## 2024-04-05 LAB
ANION GAP SERPL CALC-SCNC: 12 MMOL/L
BUN SERPL-MCNC: 8 MG/DL
CALCIUM SERPL-MCNC: 9.7 MG/DL
CHLORIDE SERPL-SCNC: 102 MMOL/L
CO2 SERPL-SCNC: 25 MMOL/L
CREAT SERPL-MCNC: 0.82 MG/DL
EGFR: 100 ML/MIN/1.73M2
GLUCOSE SERPL-MCNC: 94 MG/DL
MAGNESIUM SERPL-MCNC: 1.8 MG/DL
NT-PROBNP SERPL-MCNC: 698 PG/ML
POTASSIUM SERPL-SCNC: 4.7 MMOL/L
SODIUM SERPL-SCNC: 139 MMOL/L

## 2024-04-19 ENCOUNTER — APPOINTMENT (OUTPATIENT)
Dept: CARDIOLOGY | Facility: CLINIC | Age: 29
End: 2024-04-19
Payer: MEDICAID

## 2024-04-19 VITALS
BODY MASS INDEX: 30.83 KG/M2 | HEIGHT: 63 IN | SYSTOLIC BLOOD PRESSURE: 100 MMHG | HEART RATE: 77 BPM | DIASTOLIC BLOOD PRESSURE: 78 MMHG | OXYGEN SATURATION: 98 % | WEIGHT: 174 LBS

## 2024-04-19 DIAGNOSIS — Z95.810 PRESENCE OF AUTOMATIC (IMPLANTABLE) CARDIAC DEFIBRILLATOR: ICD-10-CM

## 2024-04-19 DIAGNOSIS — I50.810 RIGHT HEART FAILURE, UNSPECIFIED: ICD-10-CM

## 2024-04-19 DIAGNOSIS — I42.8 OTHER CARDIOMYOPATHIES: ICD-10-CM

## 2024-04-19 PROCEDURE — G2211 COMPLEX E/M VISIT ADD ON: CPT | Mod: NC,1L

## 2024-04-19 PROCEDURE — 99213 OFFICE O/P EST LOW 20 MIN: CPT

## 2024-04-21 PROBLEM — I42.8 ARRHYTHMOGENIC RIGHT VENTRICULAR DYSPLASIA: Status: ACTIVE | Noted: 2023-02-02

## 2024-04-21 PROBLEM — Z95.810 STATUS POST INTERNAL CARDIAC DEFIBRILLATOR PROCEDURE: Status: ACTIVE | Noted: 2023-06-07

## 2024-04-21 PROBLEM — I50.810 RIGHT HEART FAILURE: Status: ACTIVE | Noted: 2023-12-14

## 2024-04-21 NOTE — HISTORY OF PRESENT ILLNESS
[FreeTextEntry1] : Here for follow up. Feels well. Heart failure input noted. No SOB. Edema improved.

## 2024-04-21 NOTE — DISCUSSION/SUMMARY
[FreeTextEntry1] : ICD follow up at [facility]. Contin current meds. Heart failure follow up and going for cardiac MRI. Follow up 6 mo

## 2024-05-30 ENCOUNTER — NON-APPOINTMENT (OUTPATIENT)
Age: 29
End: 2024-05-30

## 2024-05-30 ENCOUNTER — APPOINTMENT (OUTPATIENT)
Dept: ELECTROPHYSIOLOGY | Facility: CLINIC | Age: 29
End: 2024-05-30
Payer: MEDICAID

## 2024-05-30 PROCEDURE — 93296 REM INTERROG EVL PM/IDS: CPT

## 2024-05-30 PROCEDURE — 93295 DEV INTERROG REMOTE 1/2/MLT: CPT

## 2024-07-12 ENCOUNTER — NON-APPOINTMENT (OUTPATIENT)
Age: 29
End: 2024-07-12

## 2024-07-12 ENCOUNTER — APPOINTMENT (OUTPATIENT)
Dept: HEART FAILURE | Facility: CLINIC | Age: 29
End: 2024-07-12
Payer: MEDICAID

## 2024-07-12 VITALS
SYSTOLIC BLOOD PRESSURE: 122 MMHG | DIASTOLIC BLOOD PRESSURE: 80 MMHG | HEIGHT: 63 IN | BODY MASS INDEX: 31.54 KG/M2 | TEMPERATURE: 97.5 F | OXYGEN SATURATION: 100 % | HEART RATE: 86 BPM | WEIGHT: 178 LBS

## 2024-07-12 DIAGNOSIS — R00.2 PALPITATIONS: ICD-10-CM

## 2024-07-12 DIAGNOSIS — Z95.810 PRESENCE OF AUTOMATIC (IMPLANTABLE) CARDIAC DEFIBRILLATOR: ICD-10-CM

## 2024-07-12 DIAGNOSIS — I50.810 RIGHT HEART FAILURE, UNSPECIFIED: ICD-10-CM

## 2024-07-12 LAB
ANION GAP SERPL CALC-SCNC: 16 MMOL/L
BUN SERPL-MCNC: 12 MG/DL
CALCIUM SERPL-MCNC: 9.5 MG/DL
CHLORIDE SERPL-SCNC: 102 MMOL/L
CO2 SERPL-SCNC: 21 MMOL/L
CREAT SERPL-MCNC: 0.75 MG/DL
EGFR: 111 ML/MIN/1.73M2
GLUCOSE SERPL-MCNC: 79 MG/DL
NT-PROBNP SERPL-MCNC: 871 PG/ML
POTASSIUM SERPL-SCNC: 4.3 MMOL/L
SODIUM SERPL-SCNC: 139 MMOL/L

## 2024-07-12 PROCEDURE — 99214 OFFICE O/P EST MOD 30 MIN: CPT

## 2024-07-12 PROCEDURE — G2211 COMPLEX E/M VISIT ADD ON: CPT | Mod: NC

## 2024-07-12 RX ORDER — FUROSEMIDE 20 MG/1
20 TABLET ORAL AS DIRECTED
Qty: 30 | Refills: 0 | Status: ACTIVE | COMMUNITY
Start: 2024-07-12 | End: 1900-01-01

## 2024-07-12 RX ORDER — DAPAGLIFLOZIN 10 MG/1
10 TABLET, FILM COATED ORAL
Qty: 30 | Refills: 3 | Status: ACTIVE | COMMUNITY
Start: 2024-07-12 | End: 1900-01-01

## 2024-07-30 ENCOUNTER — APPOINTMENT (OUTPATIENT)
Dept: MRI IMAGING | Facility: HOSPITAL | Age: 29
End: 2024-07-30

## 2024-07-30 ENCOUNTER — OUTPATIENT (OUTPATIENT)
Dept: OUTPATIENT SERVICES | Facility: HOSPITAL | Age: 29
LOS: 1 days | End: 2024-07-30
Payer: MEDICAID

## 2024-07-30 DIAGNOSIS — I42.8 OTHER CARDIOMYOPATHIES: ICD-10-CM

## 2024-07-30 DIAGNOSIS — I51.9 HEART DISEASE, UNSPECIFIED: ICD-10-CM

## 2024-07-30 DIAGNOSIS — Z95.810 PRESENCE OF AUTOMATIC (IMPLANTABLE) CARDIAC DEFIBRILLATOR: Chronic | ICD-10-CM

## 2024-07-30 PROCEDURE — 75561 CARDIAC MRI FOR MORPH W/DYE: CPT

## 2024-07-30 PROCEDURE — 75561 CARDIAC MRI FOR MORPH W/DYE: CPT | Mod: 26

## 2024-07-30 PROCEDURE — 71046 X-RAY EXAM CHEST 2 VIEWS: CPT

## 2024-07-30 PROCEDURE — 71046 X-RAY EXAM CHEST 2 VIEWS: CPT | Mod: 26

## 2024-07-30 PROCEDURE — A9585: CPT

## 2024-09-02 ENCOUNTER — NON-APPOINTMENT (OUTPATIENT)
Age: 29
End: 2024-09-02

## 2024-09-03 ENCOUNTER — NON-APPOINTMENT (OUTPATIENT)
Age: 29
End: 2024-09-03

## 2024-09-03 ENCOUNTER — APPOINTMENT (OUTPATIENT)
Dept: ELECTROPHYSIOLOGY | Facility: CLINIC | Age: 29
End: 2024-09-03
Payer: MEDICAID

## 2024-09-03 VITALS
HEART RATE: 80 BPM | BODY MASS INDEX: 31.54 KG/M2 | OXYGEN SATURATION: 99 % | SYSTOLIC BLOOD PRESSURE: 104 MMHG | HEIGHT: 63 IN | DIASTOLIC BLOOD PRESSURE: 65 MMHG | WEIGHT: 178 LBS

## 2024-09-03 PROCEDURE — 93261 INTERROGATE SUBQ DEFIB: CPT

## 2024-09-03 PROCEDURE — 93000 ELECTROCARDIOGRAM COMPLETE: CPT | Mod: 59

## 2024-09-16 ENCOUNTER — RX RENEWAL (OUTPATIENT)
Age: 29
End: 2024-09-16

## 2024-10-10 ENCOUNTER — TRANSCRIPTION ENCOUNTER (OUTPATIENT)
Age: 29
End: 2024-10-10

## 2024-10-18 ENCOUNTER — APPOINTMENT (OUTPATIENT)
Dept: CARDIOLOGY | Facility: CLINIC | Age: 29
End: 2024-10-18

## 2024-10-29 ENCOUNTER — NON-APPOINTMENT (OUTPATIENT)
Age: 29
End: 2024-10-29

## 2024-10-29 ENCOUNTER — INPATIENT (INPATIENT)
Facility: HOSPITAL | Age: 29
LOS: 1 days | Discharge: HOME | DRG: 951 | End: 2024-10-31
Attending: INTERNAL MEDICINE | Admitting: INTERNAL MEDICINE
Payer: COMMERCIAL

## 2024-10-29 ENCOUNTER — APPOINTMENT (OUTPATIENT)
Dept: CARDIOLOGY | Facility: CLINIC | Age: 29
End: 2024-10-29
Payer: MEDICAID

## 2024-10-29 VITALS
SYSTOLIC BLOOD PRESSURE: 112 MMHG | TEMPERATURE: 98 F | HEART RATE: 142 BPM | RESPIRATION RATE: 24 BRPM | DIASTOLIC BLOOD PRESSURE: 95 MMHG | OXYGEN SATURATION: 100 %

## 2024-10-29 VITALS — BODY MASS INDEX: 32.25 KG/M2 | OXYGEN SATURATION: 99 % | HEIGHT: 63 IN | WEIGHT: 182 LBS | HEART RATE: 140 BPM

## 2024-10-29 DIAGNOSIS — Z95.810 PRESENCE OF AUTOMATIC (IMPLANTABLE) CARDIAC DEFIBRILLATOR: Chronic | ICD-10-CM

## 2024-10-29 DIAGNOSIS — I47.20 VENTRICULAR TACHYCARDIA, UNSPECIFIED: ICD-10-CM

## 2024-10-29 DIAGNOSIS — Z82.49 FAMILY HISTORY OF ISCHEMIC HEART DISEASE AND OTHER DISEASES OF THE CIRCULATORY SYSTEM: ICD-10-CM

## 2024-10-29 LAB
ADD ON TEST-SPECIMEN IN LAB: SIGNIFICANT CHANGE UP
ALBUMIN SERPL ELPH-MCNC: 4.4 G/DL — SIGNIFICANT CHANGE UP (ref 3.3–5)
ALP SERPL-CCNC: 80 U/L — SIGNIFICANT CHANGE UP (ref 40–120)
ALT FLD-CCNC: 37 U/L — SIGNIFICANT CHANGE UP (ref 10–45)
ANION GAP SERPL CALC-SCNC: 14 MMOL/L — SIGNIFICANT CHANGE UP (ref 5–17)
AST SERPL-CCNC: 45 U/L — HIGH (ref 10–40)
BASOPHILS # BLD AUTO: 0.06 K/UL — SIGNIFICANT CHANGE UP (ref 0–0.2)
BASOPHILS NFR BLD AUTO: 0.8 % — SIGNIFICANT CHANGE UP (ref 0–2)
BILIRUB SERPL-MCNC: 0.4 MG/DL — SIGNIFICANT CHANGE UP (ref 0.2–1.2)
BUN SERPL-MCNC: 11 MG/DL — SIGNIFICANT CHANGE UP (ref 7–23)
CALCIUM SERPL-MCNC: 9.2 MG/DL — SIGNIFICANT CHANGE UP (ref 8.4–10.5)
CHLORIDE SERPL-SCNC: 102 MMOL/L — SIGNIFICANT CHANGE UP (ref 96–108)
CK MB BLD-MCNC: 6.2 % — HIGH (ref 0–3.5)
CK MB CFR SERPL CALC: 9.6 NG/ML — HIGH (ref 0–3.8)
CK SERPL-CCNC: 156 U/L — SIGNIFICANT CHANGE UP (ref 25–170)
CO2 SERPL-SCNC: 22 MMOL/L — SIGNIFICANT CHANGE UP (ref 22–31)
CREAT SERPL-MCNC: 0.66 MG/DL — SIGNIFICANT CHANGE UP (ref 0.5–1.3)
EGFR: 122 ML/MIN/1.73M2 — SIGNIFICANT CHANGE UP
EOSINOPHIL # BLD AUTO: 0.1 K/UL — SIGNIFICANT CHANGE UP (ref 0–0.5)
EOSINOPHIL NFR BLD AUTO: 1.3 % — SIGNIFICANT CHANGE UP (ref 0–6)
GAS PNL BLDV: SIGNIFICANT CHANGE UP
GLUCOSE SERPL-MCNC: 82 MG/DL — SIGNIFICANT CHANGE UP (ref 70–99)
HCG SERPL-ACNC: <2 MIU/ML — SIGNIFICANT CHANGE UP
HCT VFR BLD CALC: 47.2 % — HIGH (ref 34.5–45)
HGB BLD-MCNC: 15.2 G/DL — SIGNIFICANT CHANGE UP (ref 11.5–15.5)
IMM GRANULOCYTES NFR BLD AUTO: 0.3 % — SIGNIFICANT CHANGE UP (ref 0–0.9)
LACTATE SERPL-SCNC: 1.5 MMOL/L — SIGNIFICANT CHANGE UP (ref 0.5–2)
LYMPHOCYTES # BLD AUTO: 3.3 K/UL — SIGNIFICANT CHANGE UP (ref 1–3.3)
LYMPHOCYTES # BLD AUTO: 43.1 % — SIGNIFICANT CHANGE UP (ref 13–44)
MAGNESIUM SERPL-MCNC: 1.9 MG/DL — SIGNIFICANT CHANGE UP (ref 1.6–2.6)
MCHC RBC-ENTMCNC: 30 PG — SIGNIFICANT CHANGE UP (ref 27–34)
MCHC RBC-ENTMCNC: 32.2 GM/DL — SIGNIFICANT CHANGE UP (ref 32–36)
MCV RBC AUTO: 93.3 FL — SIGNIFICANT CHANGE UP (ref 80–100)
MONOCYTES # BLD AUTO: 0.5 K/UL — SIGNIFICANT CHANGE UP (ref 0–0.9)
MONOCYTES NFR BLD AUTO: 6.5 % — SIGNIFICANT CHANGE UP (ref 2–14)
NEUTROPHILS # BLD AUTO: 3.68 K/UL — SIGNIFICANT CHANGE UP (ref 1.8–7.4)
NEUTROPHILS NFR BLD AUTO: 48 % — SIGNIFICANT CHANGE UP (ref 43–77)
NRBC # BLD: 0 /100 WBCS — SIGNIFICANT CHANGE UP (ref 0–0)
NT-PROBNP SERPL-SCNC: 8368 PG/ML — HIGH (ref 0–300)
PLATELET # BLD AUTO: 338 K/UL — SIGNIFICANT CHANGE UP (ref 150–400)
POTASSIUM SERPL-MCNC: 4.4 MMOL/L — SIGNIFICANT CHANGE UP (ref 3.5–5.3)
POTASSIUM SERPL-SCNC: 4.4 MMOL/L — SIGNIFICANT CHANGE UP (ref 3.5–5.3)
PROT SERPL-MCNC: 8.2 G/DL — SIGNIFICANT CHANGE UP (ref 6–8.3)
RBC # BLD: 5.06 M/UL — SIGNIFICANT CHANGE UP (ref 3.8–5.2)
RBC # FLD: 13.1 % — SIGNIFICANT CHANGE UP (ref 10.3–14.5)
SODIUM SERPL-SCNC: 138 MMOL/L — SIGNIFICANT CHANGE UP (ref 135–145)
TROPONIN T, HIGH SENSITIVITY RESULT: 164 NG/L — HIGH (ref 0–51)
TROPONIN T, HIGH SENSITIVITY RESULT: 167 NG/L — HIGH (ref 0–51)
WBC # BLD: 7.66 K/UL — SIGNIFICANT CHANGE UP (ref 3.8–10.5)
WBC # FLD AUTO: 7.66 K/UL — SIGNIFICANT CHANGE UP (ref 3.8–10.5)

## 2024-10-29 PROCEDURE — 93010 ELECTROCARDIOGRAM REPORT: CPT

## 2024-10-29 PROCEDURE — 99215 OFFICE O/P EST HI 40 MIN: CPT | Mod: 25

## 2024-10-29 PROCEDURE — 86077 PHYS BLOOD BANK SERV XMATCH: CPT

## 2024-10-29 PROCEDURE — 93000 ELECTROCARDIOGRAM COMPLETE: CPT

## 2024-10-29 PROCEDURE — 93308 TTE F-UP OR LMTD: CPT | Mod: 26

## 2024-10-29 PROCEDURE — 36000 PLACE NEEDLE IN VEIN: CPT

## 2024-10-29 PROCEDURE — 99291 CRITICAL CARE FIRST HOUR: CPT

## 2024-10-29 PROCEDURE — 76937 US GUIDE VASCULAR ACCESS: CPT | Mod: 26,59

## 2024-10-29 PROCEDURE — 99292 CRITICAL CARE ADDL 30 MIN: CPT

## 2024-10-29 PROCEDURE — 99223 1ST HOSP IP/OBS HIGH 75: CPT

## 2024-10-29 RX ORDER — CHLORHEXIDINE GLUCONATE 40 MG/ML
1 SOLUTION TOPICAL
Refills: 0 | Status: DISCONTINUED | OUTPATIENT
Start: 2024-10-29 | End: 2024-10-31

## 2024-10-29 RX ORDER — PROPOFOL 10 MG/ML
5 INJECTION, EMULSION INTRAVENOUS
Qty: 500 | Refills: 0 | Status: DISCONTINUED | OUTPATIENT
Start: 2024-10-29 | End: 2024-10-29

## 2024-10-29 RX ORDER — LIDOCAINE HCL 60 MG/3 ML
100 SYRINGE (ML) INJECTION ONCE
Refills: 0 | Status: COMPLETED | OUTPATIENT
Start: 2024-10-29 | End: 2024-10-29

## 2024-10-29 RX ORDER — SODIUM CHLORIDE 9 MG/ML
1000 INJECTION, SOLUTION INTRAMUSCULAR; INTRAVENOUS; SUBCUTANEOUS ONCE
Refills: 0 | Status: COMPLETED | OUTPATIENT
Start: 2024-10-29 | End: 2024-10-29

## 2024-10-29 RX ADMIN — SODIUM CHLORIDE 1000 MILLILITER(S): 9 INJECTION, SOLUTION INTRAMUSCULAR; INTRAVENOUS; SUBCUTANEOUS at 13:43

## 2024-10-29 RX ADMIN — Medication 100 MILLIGRAM(S): at 13:43

## 2024-10-29 RX ADMIN — SODIUM CHLORIDE 1000 MILLILITER(S): 9 INJECTION, SOLUTION INTRAMUSCULAR; INTRAVENOUS; SUBCUTANEOUS at 12:35

## 2024-10-29 NOTE — CONSULT NOTE ADULT - ASSESSMENT
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 EKG c/w ventricular tachycardia at 142bpm (LB, right axis, inferiorly directed with V3 transition). The patient was having palpitations and feeling generally unwell. She did have stable BP despite the VT. Lidocaine bolus 100mg IV was attempted x 2 in the ED without resolution of her VT. She was transported to CCU where she was successfully cardioverted to NSR with 200J synchronized DCCV.     #Sustained VT  #ARVC    Recommendations:  -Monitor on telemetry in CCU  -Pt is s/p successful synchronized DCCV to NSR in CCU 10/29  -NPO at MN for EPS+VT ablation in the AM  -Active T&S  -Would avoid amiodarone prior to ablation  -If recurrent VT overnight, can start on lidocaine gtt  -D/w primary team and EP attending

## 2024-10-29 NOTE — ED PROVIDER NOTE - PROGRESS NOTE ADDITIONAL1
Brief Postoperative Note      Patient: Renetta Arguello  YOB: 1937  MRN: [id number]    Date of Procedure: 7/6/2022    Pre-Op Diagnosis: Benign prostatic hyperplasia with lower urinary tract symptoms, symptom details unspecified [N40.1]    Post-Op Diagnosis: Same       Procedure(s):  CYSTOSCOPY, BIPOLAR TRANSURETHRAL RESECTION OF PROSTATE    Surgeon(s):  Marcheta Bernheim, MD    Assistant:  * No surgical staff found *    Anesthesia: General    Estimated Blood Loss (mL): less than 973     Complications: None    Specimens:   ID Type Source Tests Collected by Time Destination   A : PROSTATE TISSUE Tissue Tissue SURGICAL PATHOLOGY Marcheta Bernheim, MD 7/6/2022 1359        Implants:  * No implants in log *      Drains:   Urinary Catheter 3 Way (Active)       Findings: Bilobar BPH    Electronically signed by Marcheta Bernheim, MD on 7/6/2022 at 2:29 PM
Additional Progress Note...

## 2024-10-29 NOTE — ED ADULT NURSE NOTE - NSFALLUNIVINTERV_ED_ALL_ED
Bed/Stretcher in lowest position, wheels locked, appropriate side rails in place/Call bell, personal items and telephone in reach/Instruct patient to call for assistance before getting out of bed/chair/stretcher/Non-slip footwear applied when patient is off stretcher/[place] to call system/Physically safe environment - no spills, clutter or unnecessary equipment/Purposeful proactive rounding/Room/bathroom lighting operational, light cord in reach

## 2024-10-29 NOTE — H&P ADULT - ASSESSMENT
Neurology      Cardiovascular   #Ventricular Tachycardia    #Histoyr of ARVD    Respiratory  Gastrointestinal   /Renal  Endocrine   Infectious Disease   Hematology  Ethics      Neurology  Propofol drip for cardioversion      Cardiovascular   #Ventricular Tachycardia  s/p Lidocaine drip with reaction of screaming   - Sedated synchronized cardioversion  -    #Histoyr of ARVC    Respiratory  - No  acute issues   - Sating well on RA       Gastrointestinal     - No Acute issues   - Monitor for BM    /Renal    - SCr and BUN within normal limits   - Trend CMPs daily    Endocrine   #No acute issues     Infectious Disease   #Afebrile  WBCs within normal limits     Hematology  - H&H stable       Ethics   full code  29 year old female with PMH of ARVC presenting from outpatient EP for ventricular tachycardia s/p cardioversion and planned for ablation tomorrow     Neurology  Propofol drip for cardioversion  Now off of all sedation   AO4      Cardiovascular   #Ventricular Tachycardia  s/p Lidocaine drip with reaction of screaming   - Sedated synchronized cardioversion  - Now in NSR  - planned for ablation tomorrow with EP, NPO MN    History of ARVC  On home metop succ, losartan leonila  - holding all medications     Respiratory  #No  acute issues   - Sating well on RA       Gastrointestinal   #No Acute issues   - Monitor for BM  - regular diet  - NPO MN for ablation     /Renal  #No acute issues   - SCr and BUN within normal limits   - Trend CMPs daily    Endocrine   #No acute issues     Infectious Disease   #Afebrile  WBCs within normal limits   - trend WBCs and monitor for fevers     Hematology  - H&H stable       Ethics   full code

## 2024-10-29 NOTE — H&P ADULT - NSHPLABSRESULTS_GEN_ALL_CORE
15.2   7.66  )-----------( 338      ( 29 Oct 2024 12:47 )             47.2       10-29    138  |  102  |  11  ----------------------------<  82  4.4   |  22  |  0.66    Ca    9.2      29 Oct 2024 12:47  Mg     1.9     10-29    TPro  8.2  /  Alb  4.4  /  TBili  0.4  /  DBili  x   /  AST  45[H]  /  ALT  37  /  AlkPhos  80  10-29              Urinalysis Basic - ( 29 Oct 2024 12:47 )    Color: x / Appearance: x / SG: x / pH: x  Gluc: 82 mg/dL / Ketone: x  / Bili: x / Urobili: x   Blood: x / Protein: x / Nitrite: x   Leuk Esterase: x / RBC: x / WBC x   Sq Epi: x / Non Sq Epi: x / Bacteria: x            Lactate Trend      CARDIAC MARKERS ( 29 Oct 2024 12:47 )  x     / x     / x     / x     / 9.6 ng/mL        CAPILLARY BLOOD GLUCOSE

## 2024-10-29 NOTE — ED PROVIDER NOTE - PRINCIPAL DIAGNOSIS
COMMON BREASTFEEDING PROBLEMS    Women can have several different problems when they breastfeed.  Women often quit breastfeeding when these problems occur, but most problems can be treated and women can continue to breastfeed their babies.    Engorgement:    This is when the breasts are too full of milk.  Breasts can be swollen, hard, warm and painful. This can also make latch more difficult for your baby.  The only proven way to reduce this is to hand express or pump to let some milk out between feedings. Letting out too much milk will exacerbate the problem by producing even more milk.    You can also try:    Cold packs    Taking pain relieving medicine such as acetaminophen (Tylenol) or ibuprofen (Advil, Motrin)    Take a warm shower    Massage your breasts to start milk flow.     Breastfeed your baby on demand, make sure baby is emptying breast completely    Prevent engorgement by limiting pumping if you are breastfeeding on demand    Sore/Painful Nipples:    Some nipple soreness is normal during the first minute of breast feeding. Pain that is lasting longer is not normal after breast feeding is established.  Pain can be caused by nipple cracks and blisters.  This is usually due to an incorrect latch.    The best way to reduce nipple soreness is to make sure your baby is latching correctly on your breast.    You can also try:    Lanolin ointment    APNO (All purpose nipple ointment) which you need a prescription for    Apply a cold or warm cloth to your nipples    Wear breast pads to protect your nipples    Let your nipples air dry after feedings    Cleanse nipple with unscented soap      Blocked Milk Ducts:    A blocked milk duct can cause a red, painful lump in your breast.  It can also cause a white plug at the end of your nipple. If you have a blocked milk duct, breastfeed more often.  Make sure your baby empties your breast after each feeding.  Start your feedings with the breast with the plugged duct and  try various positions to empty the breast as much as you can. Massage your plugged duct in a warm shower to try to unplug it. If these methods do not work you may be at risk for a breast infection.     Breast Infection:    A breast infection is called Mastitis.  In addition to having a hard, red, swollen area of your breast you will most likely will also have fever and chills and not feel well.  Engorgement and incomplete breast emptying can contribute to the problem and make your symptoms worse.    DO NOT STOP breastfeeding when you have mastitis!    Make an appointment with your midwife to be seen as soon as possible.  You will be started immediately on an antibiotic.    You may also:    Use pain medication such as  such as acetaminophen (Tylenol) or ibuprofen (Advil, Motrin)    Massage your breasts during feedings    Use warm compress to encourage milk let down prior to feeding     Use a breast pump to empty your breasts more completely after feedings    Rest for the next day or so and increase your fluids   -Take Chamomile tea, Hops infusion or lemon balm infusion     Watch for increased pain, increasing size of the breast, increasing firmness or a developing mass to suggest an abscess formation.    You should improve quickly on antibiotics.  If you are not improving with in the next 1-2 days or your fever increases > 101 F call the clinic.      For reliable information on breast feeding visit:    [employer]  [email or web address].[email or web address] ([facility])      If you are struggling with breastfeeding and need extra support you may also consider seeking the advice of a lactation consultation.    Please call with further concerns:    [facility]  [phone]                                  Patient Education     Mastitis  Mastitis occurs when breast tissue becomes swollen and inflamed. This is almost always due to infection. Mastitis most often affects  breastfeeding women during the first 6 weeks after childbirth. For this reason, it s also known as lactation mastitis. Infection may happen after a duct becomes clogged, causing milk to back up in the breast. Mastitis may also occur if bacteria enter the breast through small cracks in the nipple. (Less often, mastitis occurs in women who aren t breastfeeding. If you have mastitis that is not due to breastfeeding, your healthcare provider will give you more information as needed. Treatment may include some of the same home care measures listed below.)  Mastitis may cause flu-like symptoms such as fever, aches, and fatigue. The affected breast may feel painful, warm, tender, firm, or swollen. The skin over the breast may be red (often in a wedge-shaped pattern). You may feel a burning a sensation when breastfeeding.  In most cases, mastitis can be treated with antibiotics. This should clear the infection. If treatment is delayed, a pocket of pus (abscess) can form in the breast tissue. A procedure may then be needed to drain the pus. In severe cases of infection, other treatments may be needed.  Home care  Breastfeeding    It s very important to keep the milk flowing from the infected breast. Continue breastfeeding from both breasts as usual. This will not hurt the baby. If this is too painful, use a breast pump to remove milk from the infected side. This can be fed to your baby or discarded. Note: If you don't continue to breastfeed or pump your breast, bacteria can grow in the milk that is left in your breast. This can make your infection worse.    Tell your healthcare provider if you have problems with breastfeeding. He or she may suggest changes to your technique, if needed. You may also be referred to a lactation nurse or consultant for support with breastfeeding.  General care    Take any medicines you re prescribed as directed. If you re taking antibiotics, be sure to complete all of the medicine even if you  start to feel better. Over-the-counter pain medicines may also be recommended. Don t use breast creams or other products or medicines without talking to your healthcare provider first. Note: If you re concerned about taking medicines while breastfeeding, talk to your healthcare provider.    Rest as often as needed. Also be sure to drink plenty of fluids.    To help relieve pain and swelling, heat or ice may be used. Apply as often as directed by your provider.  ? Heat: Place a warm compress on the breast. Use a towel soaked in hot water, a heating pad, or a hot water bottle.  ? Cold: Place a cold compress on the breast. Use an ice pack or bag of ice wrapped in a thin towel. Never place a cold source directly on the skin.  Follow-up care  Follow up with your healthcare provider as advised.  When to seek medical advice  Call your healthcare provider right away if any of these occur:    Fever of 100.4 F (38 C) or higher, or as directed by your provider    Shaking chills    Worsening symptoms or symptoms that don't improve within 48 to 72 hours of starting treatment    New symptoms develop  Date Last Reviewed: 6/1/2018 2000-2018 [email or web address]. [address] [place] [address], [place] [address]. All rights reserved. This information is not intended as a substitute for professional medical care. Always follow your healthcare professional's instructions.            Ventricular tachycardia

## 2024-10-29 NOTE — ED PROVIDER NOTE - ATTENDING CONTRIBUTION TO CARE
27 year old female accompanied with her mother, presents for preop testing for scheduled ICD implant on 3/8/2023. Patient with cardiac predisposition risk due to h/o arrhythmogenic right ventricular dysplasia Presenting today with palpitations lightheadedness chest discomfort shortness of breath for the past several days went to follow-up with her electrophysiologist today Dr. Cisse but noted her to be in V. tach in the 140s and sent to the ER for further evaluation to see EP Dr. St there patient here hemodynamically stable blood pressure mentating well but wide-complex tachycardia in the 140s unclear etiology different than her baseline EKG no pacemaker spikes EP was consulted did not want us to give her any medications at this time but completed on pacemaker pads labs and cardiac workup.

## 2024-10-29 NOTE — ED PROVIDER NOTE - OBJECTIVE STATEMENT
29-year-old female past medical history of AICD in the setting of ARVD presenting for ventricular tachycardia.  Patient presented to outpatient electrophysiologist office Dr. Reese and was sent in for further evaluation.  Patient presented to the outpatient office for 2 days of feeling palpitations associated with shortness of breath and lightheadedness.  Patient states symptoms started patient denies any fevers, chest pain, abdominal pain, nausea vomit or urinary complaints.  States has been compliant with all of her medications.

## 2024-10-29 NOTE — H&P ADULT - ATTENDING COMMENTS
Patient with known ARVC and subcutaeous ICD presents with VT below ICD detection zone.  Admitted to CICU.  Patient successfully cardioverted to sinus rhythm.  Plan for VT ablation per EP.

## 2024-10-29 NOTE — H&P ADULT - NSHPPHYSICALEXAM_GEN_ALL_CORE
PHYSICAL EXAM:   GENERAL: Alert. Not confused. No acute distress. Not thin. Not cachectic. Not obese.  HEAD:  Atraumatic. Normocephalic.  EYES: EOMI. PERRLA. Normal conjunctiva/sclera.  ENT: Neck supple. No JVD. Moist oral mucosa. Not edentulous. No thrush.  CARDIAC: Regular rate. Regular rhythm. S1. S2. No murmur. No rub. No distant heart sounds.  LUNG/CHEST: CTAB. BS equal bilaterally. No wheezes. No rales. No rhonchi.  ABDOMEN: Soft. No tenderness. No distension. Normal bowel sounds.  BACK: No midline/vertebral tenderness. No flank tenderness.  VASCULAR: +2 b/l radial or ulnar pulses. Palpable DP pulses.  EXTREMITIES:  No clubbing. No cyanosis. No edema. Moving all 4.  NEUROLOGY: A&Ox3. Non-focal exam. Cranial nerves intact. Normal speech. Sensation intact.  PSYCH: Normal behavior. Normal affect.  SKIN: No jaundice. No erythema. No rash/lesion.  ICU Vital Signs Last 24 Hrs  T(C): 36.6 (29 Oct 2024 12:15), Max: 36.6 (29 Oct 2024 12:15)  T(F): 97.8 (29 Oct 2024 12:15), Max: 97.8 (29 Oct 2024 12:15)  HR: 145 (29 Oct 2024 12:45) (142 - 145)  BP: 105/85 (29 Oct 2024 12:45) (85/68 - 112/95)  BP(mean): 93 (29 Oct 2024 12:45) (75 - 102)  ABP: --  ABP(mean): --  RR: 24 (29 Oct 2024 12:45) (24 - 24)  SpO2: 100% (29 Oct 2024 12:45) (100% - 100%)    O2 Parameters below as of 29 Oct 2024 12:45  Patient On (Oxygen Delivery Method): room air          I&O's Summary

## 2024-10-29 NOTE — CONSULT NOTE ADULT - SUBJECTIVE AND OBJECTIVE BOX
CHIEF COMPLAINT:    HISTORY OF PRESENT ILLNESS: 30 yo woman with PMHX of ARVC      Allergies    No Known Allergies    Intolerances    	    MEDICATIONS:  lidocaine 2% Syringe 100 milliGRAM(s) IV Push once                  PAST MEDICAL & SURGICAL HISTORY:  Arrhythmogenic right ventricular dysplasia      PVC (premature ventricular contraction)      H/O low back pain      H/O acne      Anxiety and depression      H/O female dyspareunia      Dyspnea on exertion      H/O eczema      Intertrigo      Nonsustained paroxysmal ventricular tachycardia      History of implantable cardiac defibrillator (ICD)          FAMILY HISTORY:  Family history of cardiomyopathy (Father)        SOCIAL HISTORY:    [ ]Non-smoker  [ ] Smoker  [ ] Alcohol      REVIEW OF SYSTEMS:  See HPI. All ROS negative unless otherwise noted    PHYSICAL EXAM:  T(C): 36.6 (10-29-24 @ 12:15), Max: 36.6 (10-29-24 @ 12:15)  HR: 145 (10-29-24 @ 12:45) (142 - 145)  BP: 105/85 (10-29-24 @ 12:45) (85/68 - 112/95)  RR: 24 (10-29-24 @ 12:45) (24 - 24)  SpO2: 100% (10-29-24 @ 12:45) (100% - 100%)  Wt(kg): --  I&O's Summary      Appearance: Alert. NAD	  HEENT:   NC/AT	  Cardiovascular: +S1S2 RRR no m/g/r  Respiratory: CTA B/L	  Psychiatry: A & O x 3, Mood & affect appropriate  Gastrointestinal:  Soft, NT.ND., + BS	  Skin: No rashes	  Neurologic: Non-focal  Extremities: No edema BLE  Vascular: Peripheral pulses palpable 2+ bilaterally      LABS:	 	              proBNP:   Lipid Profile:   HgA1c:   TSH:       CARDIAC MARKERS:                TELEMETRY: 	    ECG:  	  RADIOLOGY:  OTHER: 	    PREVIOUS DIAGNOSTIC TESTING:    Echocardiogram:    Catheterization:    Stress Test:  	  	  ASSESSMENT/PLAN: 	     CHIEF COMPLAINT: Sustained VT    HISTORY OF PRESENT ILLNESS: 28 yo woman with PMHX of dilated RV with RVSD, found to have ARVC on genetic testing, S-ICD placement 3/8/2023, who presented to the ED from Dr Reees's office after being found to be in sustained VT. EKG c/w ventricular tachycardia at 142bpm (LB, right axis, inferiorly directed with V3 transition). The patient was having palpitations and feeling generally unwell. She did have stable BP despite the VT. Lidocaine bolus 100mg IV was attempted x 2 in the ED without resolution of her VT. She was transported to CCU where she was successfully cardioverted to NSR with 200J synchronized DCCV.       Allergies    No Known Allergies    Intolerances    	    MEDICATIONS:  lidocaine 2% Syringe 100 milliGRAM(s) IV Push once    Home meds:  Losartan  Toprol XL  Sertraline  Aldactone    PAST MEDICAL & SURGICAL HISTORY:  Arrhythmogenic right ventricular dysplasia      PVC (premature ventricular contraction)      H/O low back pain      H/O acne      Anxiety and depression      H/O female dyspareunia      Dyspnea on exertion      H/O eczema      Intertrigo      Nonsustained paroxysmal ventricular tachycardia      History of implantable cardiac defibrillator (ICD)          FAMILY HISTORY:  Family history of cardiomyopathy (Father)        SOCIAL HISTORY:    [ ]Non-smoker  [ ] Smoker  [ ] Alcohol      REVIEW OF SYSTEMS:  See HPI. All ROS negative unless otherwise noted    PHYSICAL EXAM:  T(C): 36.6 (10-29-24 @ 12:15), Max: 36.6 (10-29-24 @ 12:15)  HR: 145 (10-29-24 @ 12:45) (142 - 145)  BP: 105/85 (10-29-24 @ 12:45) (85/68 - 112/95)  RR: 24 (10-29-24 @ 12:45) (24 - 24)  SpO2: 100% (10-29-24 @ 12:45) (100% - 100%)  Wt(kg): --  I&O's Summary      Appearance: Alert. NAD	  HEENT:   NC/AT	  Cardiovascular: +S1S2 tachycardic  Respiratory: CTA B/L	  Psychiatry: A & O x 3, Mood & affect appropriate  Gastrointestinal:  Soft	  Skin: No rashes	  Neurologic: Non-focal  Extremities: No edema BLE      LABS:	   Complete Blood Count + Automated Diff (10.29.24 @ 12:47)    WBC Count: 7.66 K/uL   RBC Count: 5.06 M/uL   Hemoglobin: 15.2 g/dL   Hematocrit: 47.2 %   Mean Cell Volume: 93.3 fl   Mean Cell Hemoglobin: 30.0 pg   Mean Cell Hemoglobin Conc: 32.2 gm/dL   Red Cell Distrib Width: 13.1 %   Platelet Count - Automated: 338 K/uL   Auto Neutrophil #: 3.68 K/uL   Auto Lymphocyte #: 3.30 K/uL   Auto Monocyte #: 0.50 K/uL   Auto Eosinophil #: 0.10 K/uL   Auto Basophil #: 0.06 K/uL   Auto Neutrophil %: 48.0: Differential percentages must be correlated with absolute numbers for  clinical significance. %   Auto Lymphocyte %: 43.1 %   Auto Monocyte %: 6.5 %   Auto Eosinophil %: 1.3 %   Auto Basophil %: 0.8 %   Auto Immature Granulocyte %: 0.3: (Includes meta, myelo and promyelocytes). Mild elevations in immature  granulocytes may be seen with many inflammatory processes and pregnancy;  clinical correlation suggested. %   Nucleated RBC: 0 /100 WBCs    	  Comprehensive Metabolic Panel (10.29.24 @ 12:47)    Sodium: 138 mmol/L   Potassium: 4.4 mmol/L   Chloride: 102 mmol/L   Carbon Dioxide: 22 mmol/L   Anion Gap: 14 mmol/L   Blood Urea Nitrogen: 11 mg/dL   Creatinine: 0.66 mg/dL   Glucose: 82 mg/dL   Calcium: 9.2 mg/dL   Protein Total: 8.2 g/dL   Albumin: 4.4 g/dL   Bilirubin Total: 0.4 mg/dL   Alkaline Phosphatase: 80 U/L   Aspartate Aminotransferase (AST/SGOT): 45: Mild hemolysis results may be falsely elevated U/L   Alanine Aminotransferase (ALT/SGPT): 37 U/L   eGFR: 122: The estimated glomerular filtration rate (eGFR) calculation is based on  the 2021 CKD-EPI creatinine equation, which is validated in male and  female population 18 years of age and older (N Engl J Med 2021;  385:2294-1507). mL/min/1.73m2        HCG Quantitative, Serum (10.29.24 @ 12:47)    HCG Quantitative, Serum: <2.0:        CARDIAC MARKERS: Troponin T, High Sensitivity (10.29.24 @ 14:41)    Troponin T, High Sensitivity Result: 164: Moderate Hemolysis,  Troponin T results may be falsely decreased        TELEMETRY: Sustained VT @140bpm    ECG: Ventricular tachycardia at 142bpm (LB, right axis, inferiorly directed with V3 transition).     	    PREVIOUS DIAGNOSTIC TESTING:      < from: MR Cardiac w/wo IV Cont (09.27.22 @ 15:39) >  FINDINGS:    Image quality degraded due to motion artifact.    Chambers: The right ventricle is mildly dilated. The right ventricular   wall motion appears overall mildly hypokinetic.    The right atrium is normal in size.    The left ventricle is normal in size. There is normal wall motion and   function of the left ventricle.    The left atrium is normal in size.    There is no late gadolinium enhancement of the left or right ventricular   myocardium identified.    LVEDV is 149 mL  Index LVEDV is 77 mL/m2    LVESV is 61 mL  Index LVESV is 32 mL/m2    LVSV is 88 mL    LVEF is 59%    RVEDV is 196 mL  Index RVEDV is 102 mL/m2    RVESV is 113 mL  Index RVESV is 58 mL/m2    RVSV is 83 mL    RVEF is 43%    Valves: No CMR evidence of valvular abnormality.    Vessels: Normal caliber thoracic aorta. Main pulmonary artery diameter   2.1 cm.    Thorax: No pleural or pericardial effusion. No mediastinal   lymphadenopathy.    Imaged Abdomen: Unremarkable.    IMPRESSION:.    The right ventricle is dilated (index RVEDV is 102 mL/m2) and appears   mildly hypokinetic with calculated RV ejection fraction of 43%.    No evidence of myocardial scarring.    < end of copied text >      Catheterization:    Stress Test:  	  	  ASSESSMENT/PLAN:

## 2024-10-29 NOTE — H&P ADULT - HISTORY OF PRESENT ILLNESS
29-year-old female past medical history of AICD in the setting of ARVD presenting for ventricular tachycardia.  Patient presented to outpatient electrophysiologist office Dr. Reese and was sent in for further evaluation.  Patient presented to the outpatient office for 2 days of feeling palpitations associated with shortness of breath and lightheadedness.   29-year-old female past medical history of AICD in the setting of ARVD presenting for ventricular tachycardia.  Patient presented to outpatient electrophysiologist office Dr. Reese and was sent in for further evaluation.  Patient presented to the outpatient office for 2 days of feeling palpitations associated with shortness of breath and lightheadedness.     ED NS bolus lidocaine TTE , slow VT has 2 lidocaine boluses but did not react well    LABS - toponin 167, BMP 8368 CB VBG 7.30 CO2 52 Bicarb 36  29-year-old female past medical history of AICD in the setting of ARVD presenting for ventricular tachycardia.  Per patient on Sunday she began to have symptoms of chest tightness sweating and palpation. She thought at the time that symptoms were due to indigestion. On Monday symptoms persisted and worsened without relief and on Monday night she sweat through her sheets  Patient presented to outpatient electrophysiologist office Dr. Reese this morning who sent her to the ED after and EKG that showed an abnormal rhythm. In the ED she was given lidocaine boluses and had a bad reaction that led to sedation and screaming reaction. Patient was transferred to the CICU received  synchronized cardioversion    ED NS bolus lidocaine TTE , slow VT has 2 lidocaine boluses but did not react well    LABS - troponin 167, BMP 8368 CB VBG 7.30 CO2 52 Bicarb 36  No imaging, cardioverted to sinus under sedation to sinus rhythms

## 2024-10-29 NOTE — PROGRESS NOTE ADULT - ASSESSMENT
Assessment: 29 year old female with PMH of ARVC presenting from outpatient EP for ventricular tachycardia s/p cardioversion and planned for ablation tomorrow     ====NEURO====  #No active issues  - propofol for cardioversion, now off all sedation  - AO4    ====CARDIOVASCULAR====   #Ventricular Tachycardia  - Lido bolus and gtt in ED, patient became agitated and unresponsive  - s/p Sedated synchronized cardioversion x1  - Now in NSR  - planned for ablation tomorrow with EP, NPO MN  -     History of ARVC  On home metop succ, losartan leonila  - holding all medications     Respiratory  #No  acute issues   - Sating well on RA       Gastrointestinal   #No Acute issues   - Monitor for BM  - regular diet  - NPO MN for ablation     /Renal  #No acute issues   - SCr and BUN within normal limits   - Trend CMPs daily    Endocrine   #No acute issues     Infectious Disease   #Afebrile  WBCs within normal limits   - trend WBCs and monitor for fevers     Hematology  - H&H stable       Ethics   full code

## 2024-10-29 NOTE — H&P ADULT - NSHPSOCIALHISTORY_GEN_ALL_CORE
Denies Alcohol use   Denies IVDU  Denies Tobacco use   Lives at home with mother and works at [employer]

## 2024-10-29 NOTE — ED PROVIDER NOTE - PROGRESS NOTE DETAILS
Dawit Beatty DO (PGY3)  EP consulted - at the bedside. Dawit Beatty DO (PGY3)  patient received two lidocaine boluses - of 100mg and did not break her rhythm. Patient to be admitted to CCU. Aishwarya Alexander MD, PGY3:  ARVD s/p AICD in 2023, p/w 2days of palps, GUTIÉRREZ, was found to be in sustained Vtach at 140s, s/p x2 boluses of lido, CICU attending requesting cardioversion in ED prior to bed placement in CICU. will consent pt for procedural sedation. Aishwarya Alexander MD, PGY3: discussed with Attending Miquel Jimenez pt will go to CICU now and get cardioverted in CICU

## 2024-10-29 NOTE — ED PROVIDER NOTE - PHYSICAL EXAMINATION
Dawit Beatty DO (PGY3)   Physical Exam:    Gen: NAD, AOx3  Head: NCAT  HEENT: EOMI, PEERLA  Lung: CTAB, no respiratory distress, no wheezes/rhonchi/rales B/L  CV: tachycardic  Abd: soft, NT, ND, no guarding, no rigidity, no rebound tenderness, no CVA tenderness   MSK: no visible deformities, ROM normal in UE/LE, no back pain  Neuro: No focal sensory or motor deficits. Sensation intact to light touch all extremities.  Skin: Warm, well perfused, no rash, no leg swelling  Psych: normal affect, calm

## 2024-10-29 NOTE — H&P ADULT - NSICDXPASTMEDICALHX_GEN_ALL_CORE_FT
PAST MEDICAL HISTORY:  Anxiety and depression     Arrhythmogenic right ventricular dysplasia     Dyspnea on exertion     H/O acne     H/O eczema     H/O female dyspareunia     H/O low back pain     Intertrigo     Nonsustained paroxysmal ventricular tachycardia

## 2024-10-29 NOTE — PROGRESS NOTE ADULT - SUBJECTIVE AND OBJECTIVE BOX
MERY MARQUES  MRN-[id number]  Patient is a 29y old  Female who presents with a chief complaint of   HPI:   29-year-old female past medical history of AICD in the setting of ARVD presenting for ventricular tachycardia.  Per patient on Sunday she began to have symptoms of chest tightness sweating and palpation. She thought at the time that symptoms were due to indigestion. On Monday symptoms persisted and worsened without relief and on Monday night she sweat through her sheets  Patient presented to outpatient electrophysiologist office Dr. Reese this morning who sent her to the ED after and EKG that showed an abnormal rhythm. In the ED she was given lidocaine boluses and had a bad reaction that led to sedation and screaming reaction. Patient was transferred to the CICU received  synchronized cardioversion    ED NS bolus lidocaine TTE , slow VT has 2 lidocaine boluses but did not react well    LABS - troponin 167, BMP 8368 CB VBG 7.30 CO2 52 Bicarb 36  No imaging, cardioverted to sinus under sedation to sinus rhythms  (29 Oct 2024 14:30)      24 HOUR EVENTS:  - s/p lido bolus in ED with no improvement in VT  - DCCV x1 in CICU with return to NSR  - Pending ablation tomorrow with EP, NPO after Midnight    REVIEW OF SYSTEMS:  CONSTITUTIONAL: No weakness, fevers or chills  EYES/ENT: No visual changes;  No vertigo or throat pain   NECK: No pain or stiffness  RESPIRATORY: No cough, wheezing, hemoptysis; No shortness of breath  CARDIOVASCULAR: No chest pain or palpitations  GASTROINTESTINAL: No abdominal or epigastric pain. No nausea, vomiting, or hematemesis; No diarrhea or constipation. No melena or hematochezia.  GENITOURINARY: No dysuria, frequency or hematuria  NEUROLOGICAL: No numbness or weakness  SKIN: No itching, rashes      ICU Vital Signs Last 24 Hrs  T(C): 36.9 (29 Oct 2024 17:00), Max: 36.9 (29 Oct 2024 17:00)  T(F): 98.4 (29 Oct 2024 17:00), Max: 98.4 (29 Oct 2024 17:00)  HR: 76 (29 Oct 2024 19:00) (67 - 145)  BP: 96/74 (29 Oct 2024 19:00) (85/68 - 112/95)  BP(mean): 81 (29 Oct 2024 19:00) (72 - 102)  RR: 26 (29 Oct 2024 19:00) (18 - 33)  SpO2: 100% (29 Oct 2024 19:00) (75% - 100%)    O2 Parameters below as of 29 Oct 2024 19:00  Patient On (Oxygen Delivery Method): nasal cannula  O2 Flow (L/min): 4    I&O's Summary    29 Oct 2024 07:01  -  29 Oct 2024 19:24  --------------------------------------------------------  IN: 240 mL / OUT: 0 mL / NET: 240 mL    PHYSICAL EXAM:  GENERAL: No acute distress, well-developed  HEAD:  Atraumatic, Normocephalic  EYES: EOMI, PERRLA, conjunctiva and sclera clear  NECK: Supple, no lymphadenopathy, no JVD  CHEST/LUNG: CTAB; No wheezes, rales, or rhonchi  HEART: Regular rate and rhythm. Normal S1/S2. No murmurs, rubs, or gallops  ABDOMEN: Soft, non-tender, non-distended; normal bowel sounds, no organomegaly  EXTREMITIES:  2+ peripheral pulses b/l, No clubbing, cyanosis, or edema  NEUROLOGY: A&O x 3, no focal deficits  SKIN: No rashes or lesions    ============================I/O===========================   I&O's Detail    29 Oct 2024 07:01  -  29 Oct 2024 19:24  --------------------------------------------------------  IN:    Oral Fluid: 240 mL  Total IN: 240 mL    OUT:  Total OUT: 0 mL    Total NET: 240 mL        ============================ LABS =========================                        15.2   7.66  )-----------( 338      ( 29 Oct 2024 12:47 )             47.2     10-29    138  |  102  |  11  ----------------------------<  82  4.4   |  22  |  0.66    Ca    9.2      29 Oct 2024 12:47  Mg     1.9     10-29    TPro  8.2  /  Alb  4.4  /  TBili  0.4  /  DBili  x   /  AST  45[H]  /  ALT  37  /  AlkPhos  80  10-29    Troponin T, High Sensitivity Result: 164 ng/L (10-29-24 @ 14:41)  Troponin T, High Sensitivity Result: 167 ng/L (10-29-24 @ 12:47)    CKMB Units: 9.6 ng/mL (10-29-24 @ 12:47)      CPK Mass Assay %: 6.2 % (10-29-24 @ 12:47)        LIVER FUNCTIONS - ( 29 Oct 2024 12:47 )  Alb: 4.4 g/dL / Pro: 8.2 g/dL / ALK PHOS: 80 U/L / ALT: 37 U/L / AST: 45 U/L / GGT: x               Blood Gas Venous - Lactate: 2.3 mmol/L (10-29-24 @ 12:20)    Urinalysis Basic - ( 29 Oct 2024 12:47 )    Color: x / Appearance: x / SG: x / pH: x  Gluc: 82 mg/dL / Ketone: x  / Bili: x / Urobili: x   Blood: x / Protein: x / Nitrite: x   Leuk Esterase: x / RBC: x / WBC x   Sq Epi: x / Non Sq Epi: x / Bacteria: x      ======================Micro/Rad/Cardio=================  Telemetry: Reviewed   EKG: Reviewed  CXR: Reviewed  Culture: Reviewed   Echo:   Cath:

## 2024-10-29 NOTE — CONSULT NOTE ADULT - NS ATTEND OPT1 GEN_ALL_CORE
abdominal pain
I attest my time as attending is greater than 50% of the total combined time spent on qualifying patient care activities by the PA/NP and attending.

## 2024-10-29 NOTE — CONSULT NOTE ADULT - NS ATTEND AMEND GEN_ALL_CORE FT
Patient seen multiple times throughout the day today - ARVD with sustained VT (L bundle, right inferior axis, V4 transition, 140bpm), did not terminate with Lido 100 x 2. Brought to CICU, sedation performed by CICU team, successful defibrillation (200J) externally, NPO for VT ablation, active T/S. Risks including, but not limited to perforation, damage to the conduction system, need for epicardial ablation reviewed with the patient and her mother. Also spoke with her outpatient Cardiologist.    EVARISTO Marshall

## 2024-10-29 NOTE — H&P ADULT - NSHPREVIEWOFSYSTEMS_GEN_ALL_CORE
REVIEW OF SYSTEMS:  CONSTITUTIONAL: No weakness. No fevers. No chills.   EYES: No blurry vision. No eye pain.  CARDIAC: No chest pain. No palpitations. No lightheadedness.   RESPIRATORY: No cough. No SOB.  GASTROINTESTINAL: No abdominal pain. No nausea. No vomiting. No diarrhea. No constipation  GENITOURINARY: No dysuria. No frequency.   NEUROLOGICAL: No numbness/tingling. No focal weakness. No headache.  BACK: No back pain. No flank pain.  EXTREMITIES: No lower extremity edema. Full ROM. No joint pain.  SKIN: No rashes. No other lesions.  HEMATOLOGIC/LYMPHATIC: No easy bruising. No swollen or tender lymph nodes.  PSYCHIATRIC: No depression. No anxiety. No SI/HI.  ALLERGIC: No lip swelling. No hives.  All other systems reviewed and are negative unless indicated above.

## 2024-10-29 NOTE — PATIENT PROFILE ADULT - FALL HARM RISK - RISK INTERVENTIONS
Assistance OOB with selected safe patient handling equipment/Assistance with ambulation/Communicate Fall Risk and Risk Factors to all staff, patient, and family/Discuss with provider need for PT consult/Monitor gait and stability/Provide patient with walking aids - walker, cane, crutches/Reinforce activity limits and safety measures with patient and family/Visual Cue: Yellow wristband/Bed in lowest position, wheels locked, appropriate side rails in place/Call bell, personal items and telephone in reach/Instruct patient to call for assistance before getting out of bed or chair/Non-slip footwear when patient is out of bed/[place] to call system/Physically safe environment - no spills, clutter or unnecessary equipment/Purposeful Proactive Rounding/Room/bathroom lighting operational, light cord in reach Assistance OOB with selected safe patient handling equipment/Communicate Fall Risk and Risk Factors to all staff, patient, and family/Discuss with provider need for PT consult/Monitor gait and stability/Provide patient with walking aids - walker, cane, crutches/Reinforce activity limits and safety measures with patient and family/Visual Cue: Yellow wristband/Bed in lowest position, wheels locked, appropriate side rails in place/Call bell, personal items and telephone in reach/Instruct patient to call for assistance before getting out of bed or chair/Non-slip footwear when patient is out of bed/[place] to call system/Physically safe environment - no spills, clutter or unnecessary equipment/Purposeful Proactive Rounding/Room/bathroom lighting operational, light cord in reach

## 2024-10-29 NOTE — ED ADULT NURSE NOTE - OBJECTIVE STATEMENT
30 y/o female came to the Ed from outpatient electrophysiologist for Ventricular Tachycardia. Complaints of palpitations, chest tightness, SOB, lightheadedness. History of ARVD with AICD. Denies headache, dizziness, numbness, tingling, weakness, fevers, chills.

## 2024-10-30 PROBLEM — I49.3 VENTRICULAR PREMATURE DEPOLARIZATION: Chronic | Status: INACTIVE | Noted: 2023-02-22 | Resolved: 2024-10-29

## 2024-10-30 LAB
ALBUMIN SERPL ELPH-MCNC: 3.3 G/DL — SIGNIFICANT CHANGE UP (ref 3.3–5)
ALBUMIN SERPL ELPH-MCNC: 3.6 G/DL — SIGNIFICANT CHANGE UP (ref 3.3–5)
ALP SERPL-CCNC: 50 U/L — SIGNIFICANT CHANGE UP (ref 40–120)
ALP SERPL-CCNC: 65 U/L — SIGNIFICANT CHANGE UP (ref 40–120)
ALT FLD-CCNC: 26 U/L — SIGNIFICANT CHANGE UP (ref 10–45)
ALT FLD-CCNC: 28 U/L — SIGNIFICANT CHANGE UP (ref 10–45)
ANION GAP SERPL CALC-SCNC: 11 MMOL/L — SIGNIFICANT CHANGE UP (ref 5–17)
ANION GAP SERPL CALC-SCNC: 8 MMOL/L — SIGNIFICANT CHANGE UP (ref 5–17)
AST SERPL-CCNC: 30 U/L — SIGNIFICANT CHANGE UP (ref 10–40)
AST SERPL-CCNC: 80 U/L — HIGH (ref 10–40)
BASOPHILS # BLD AUTO: 0.06 K/UL — SIGNIFICANT CHANGE UP (ref 0–0.2)
BASOPHILS NFR BLD AUTO: 0.9 % — SIGNIFICANT CHANGE UP (ref 0–2)
BILIRUB SERPL-MCNC: 0.2 MG/DL — SIGNIFICANT CHANGE UP (ref 0.2–1.2)
BILIRUB SERPL-MCNC: 0.4 MG/DL — SIGNIFICANT CHANGE UP (ref 0.2–1.2)
BLD GP AB SCN SERPL QL: POSITIVE — SIGNIFICANT CHANGE UP
BUN SERPL-MCNC: 17 MG/DL — SIGNIFICANT CHANGE UP (ref 7–23)
BUN SERPL-MCNC: 17 MG/DL — SIGNIFICANT CHANGE UP (ref 7–23)
CALCIUM SERPL-MCNC: 8.2 MG/DL — LOW (ref 8.4–10.5)
CALCIUM SERPL-MCNC: 8.6 MG/DL — SIGNIFICANT CHANGE UP (ref 8.4–10.5)
CHLORIDE SERPL-SCNC: 103 MMOL/L — SIGNIFICANT CHANGE UP (ref 96–108)
CHLORIDE SERPL-SCNC: 104 MMOL/L — SIGNIFICANT CHANGE UP (ref 96–108)
CO2 SERPL-SCNC: 19 MMOL/L — LOW (ref 22–31)
CO2 SERPL-SCNC: 21 MMOL/L — LOW (ref 22–31)
CREAT SERPL-MCNC: 0.67 MG/DL — SIGNIFICANT CHANGE UP (ref 0.5–1.3)
CREAT SERPL-MCNC: 0.75 MG/DL — SIGNIFICANT CHANGE UP (ref 0.5–1.3)
EGFR: 111 ML/MIN/1.73M2 — SIGNIFICANT CHANGE UP
EGFR: 122 ML/MIN/1.73M2 — SIGNIFICANT CHANGE UP
EOSINOPHIL # BLD AUTO: 0.08 K/UL — SIGNIFICANT CHANGE UP (ref 0–0.5)
EOSINOPHIL NFR BLD AUTO: 1.3 % — SIGNIFICANT CHANGE UP (ref 0–6)
GLUCOSE SERPL-MCNC: 102 MG/DL — HIGH (ref 70–99)
GLUCOSE SERPL-MCNC: 97 MG/DL — SIGNIFICANT CHANGE UP (ref 70–99)
HCT VFR BLD CALC: 37.9 % — SIGNIFICANT CHANGE UP (ref 34.5–45)
HGB BLD-MCNC: 12.5 G/DL — SIGNIFICANT CHANGE UP (ref 11.5–15.5)
IMM GRANULOCYTES NFR BLD AUTO: 0.2 % — SIGNIFICANT CHANGE UP (ref 0–0.9)
LYMPHOCYTES # BLD AUTO: 2.76 K/UL — SIGNIFICANT CHANGE UP (ref 1–3.3)
LYMPHOCYTES # BLD AUTO: 43.7 % — SIGNIFICANT CHANGE UP (ref 13–44)
MAGNESIUM SERPL-MCNC: 1.8 MG/DL — SIGNIFICANT CHANGE UP (ref 1.6–2.6)
MAGNESIUM SERPL-MCNC: 1.8 MG/DL — SIGNIFICANT CHANGE UP (ref 1.6–2.6)
MCHC RBC-ENTMCNC: 30.9 PG — SIGNIFICANT CHANGE UP (ref 27–34)
MCHC RBC-ENTMCNC: 33 G/DL — SIGNIFICANT CHANGE UP (ref 32–36)
MCV RBC AUTO: 93.8 FL — SIGNIFICANT CHANGE UP (ref 80–100)
MONOCYTES # BLD AUTO: 0.47 K/UL — SIGNIFICANT CHANGE UP (ref 0–0.9)
MONOCYTES NFR BLD AUTO: 7.4 % — SIGNIFICANT CHANGE UP (ref 2–14)
NEUTROPHILS # BLD AUTO: 2.94 K/UL — SIGNIFICANT CHANGE UP (ref 1.8–7.4)
NEUTROPHILS NFR BLD AUTO: 46.5 % — SIGNIFICANT CHANGE UP (ref 43–77)
NRBC # BLD: 0 /100 WBCS — SIGNIFICANT CHANGE UP (ref 0–0)
PHOSPHATE SERPL-MCNC: 3.7 MG/DL — SIGNIFICANT CHANGE UP (ref 2.5–4.5)
PHOSPHATE SERPL-MCNC: 3.7 MG/DL — SIGNIFICANT CHANGE UP (ref 2.5–4.5)
PLATELET # BLD AUTO: 269 K/UL — SIGNIFICANT CHANGE UP (ref 150–400)
POTASSIUM SERPL-MCNC: 4.8 MMOL/L — SIGNIFICANT CHANGE UP (ref 3.5–5.3)
POTASSIUM SERPL-MCNC: 6.9 MMOL/L — CRITICAL HIGH (ref 3.5–5.3)
POTASSIUM SERPL-SCNC: 4.8 MMOL/L — SIGNIFICANT CHANGE UP (ref 3.5–5.3)
POTASSIUM SERPL-SCNC: 6.9 MMOL/L — CRITICAL HIGH (ref 3.5–5.3)
PROT SERPL-MCNC: 6.6 G/DL — SIGNIFICANT CHANGE UP (ref 6–8.3)
PROT SERPL-MCNC: 6.7 G/DL — SIGNIFICANT CHANGE UP (ref 6–8.3)
RBC # BLD: 4.04 M/UL — SIGNIFICANT CHANGE UP (ref 3.8–5.2)
RBC # FLD: 12.9 % — SIGNIFICANT CHANGE UP (ref 10.3–14.5)
RH IG SCN BLD-IMP: POSITIVE — SIGNIFICANT CHANGE UP
SODIUM SERPL-SCNC: 131 MMOL/L — LOW (ref 135–145)
SODIUM SERPL-SCNC: 135 MMOL/L — SIGNIFICANT CHANGE UP (ref 135–145)
T3FREE SERPL-MCNC: 2.59 PG/ML — SIGNIFICANT CHANGE UP (ref 2–4.4)
T4 FREE SERPL-MCNC: 1.1 NG/DL — SIGNIFICANT CHANGE UP (ref 0.9–1.8)
WBC # BLD: 6.32 K/UL — SIGNIFICANT CHANGE UP (ref 3.8–10.5)
WBC # FLD AUTO: 6.32 K/UL — SIGNIFICANT CHANGE UP (ref 3.8–10.5)

## 2024-10-30 PROCEDURE — 93623 PRGRMD STIMJ&PACG IV RX NFS: CPT | Mod: 26

## 2024-10-30 PROCEDURE — 99291 CRITICAL CARE FIRST HOUR: CPT

## 2024-10-30 PROCEDURE — 93010 ELECTROCARDIOGRAM REPORT: CPT

## 2024-10-30 PROCEDURE — 93662 INTRACARDIAC ECG (ICE): CPT | Mod: 26

## 2024-10-30 PROCEDURE — 93654 COMPRE EP EVAL TX VT: CPT

## 2024-10-30 PROCEDURE — 99292 CRITICAL CARE ADDL 30 MIN: CPT

## 2024-10-30 RX ORDER — MAGNESIUM SULFATE IN 0.9% NACL 2 G/50 ML
2 INTRAVENOUS SOLUTION, PIGGYBACK (ML) INTRAVENOUS ONCE
Refills: 0 | Status: COMPLETED | OUTPATIENT
Start: 2024-10-30 | End: 2024-10-30

## 2024-10-30 RX ORDER — METOPROLOL TARTRATE 50 MG
50 TABLET ORAL DAILY
Refills: 0 | Status: DISCONTINUED | OUTPATIENT
Start: 2024-10-30 | End: 2024-10-31

## 2024-10-30 RX ORDER — HEPARIN SODIUM 10000 [USP'U]/ML
5000 INJECTION INTRAVENOUS; SUBCUTANEOUS EVERY 8 HOURS
Refills: 0 | Status: DISCONTINUED | OUTPATIENT
Start: 2024-10-31 | End: 2024-10-31

## 2024-10-30 RX ORDER — SPIRONOLACTONE 100 MG
25 TABLET ORAL DAILY
Refills: 0 | Status: DISCONTINUED | OUTPATIENT
Start: 2024-10-30 | End: 2024-10-31

## 2024-10-30 RX ADMIN — Medication 50 MILLIGRAM(S): at 17:42

## 2024-10-30 RX ADMIN — Medication 25 MILLIGRAM(S): at 17:42

## 2024-10-30 RX ADMIN — Medication 25 GRAM(S): at 04:01

## 2024-10-30 RX ADMIN — CHLORHEXIDINE GLUCONATE 1 APPLICATION(S): 40 SOLUTION TOPICAL at 06:26

## 2024-10-30 NOTE — CONSULT NOTE ADULT - SUBJECTIVE AND OBJECTIVE BOX
HPI:   29-year-old female past medical history of AICD in the setting of ARVD presenting for ventricular tachycardia.  Per patient on Sunday she began to have symptoms of chest tightness sweating and palpation. She thought at the time that symptoms were due to indigestion. On Monday symptoms persisted and worsened without relief and on Monday night she sweat through her sheets  Patient presented to outpatient electrophysiologist office Dr. Reese this morning who sent her to the ED after and EKG that showed an abnormal rhythm. In the ED she was given lidocaine boluses and had a bad reaction that led to sedation and screaming reaction. Patient was transferred to the CICU received  synchronized cardioversion    ED NS bolus lidocaine TTE , slow VT has 2 lidocaine boluses but did not react well    LABS - troponin 167, BMP 8368 CB VBG 7.30 CO2 52 Bicarb 36  No imaging, cardioverted to sinus under sedation to sinus rhythms  (29 Oct 2024 14:30)      PAST MEDICAL & SURGICAL HISTORY  Arrhythmogenic right ventricular dysplasia    H/O low back pain    H/O acne    Anxiety and depression    H/O female dyspareunia    Dyspnea on exertion    H/O eczema    Intertrigo    Nonsustained paroxysmal ventricular tachycardia    History of implantable cardiac defibrillator (ICD)        FAMILY HISTORY:  FAMILY HISTORY:  Family history of cardiomyopathy (Father)        SOCIAL HISTORY:  []smoker  []Alcohol  []Drug    ALLERGIES:  No Known Allergies      MEDICATIONS:  MEDICATIONS  (STANDING):  chlorhexidine 2% Cloths 1 Application(s) Topical <User Schedule>    MEDICATIONS  (PRN):      HOME MEDICATIONS:  Home Medications:  losartan 25 mg oral tablet: 1 tab(s) orally once a day (29 Oct 2024 18:00)  metoprolol succinate 25 mg oral tablet, extended release: 1 tab(s) orally once a day (29 Oct 2024 17:59)  Multiple Vitamins oral tablet: 1 tab(s) orally once a day (29 Oct 2024 18:00)  spironolactone 25 mg oral tablet: 1 tab(s) orally once a day  for acne (29 Oct 2024 18:00)  Zoloft 50 mg oral tablet: 1 tab(s) orally 2 times a day as needed for  anxiety (29 Oct 2024 18:00)      VITALS:   T(F): 98.1 (10-30 @ 13:16), Max: 98.4 (10-29 @ 17:00)  HR: 66 (10-30 @ 14:00) (63 - 145)  BP: 103/60 (10-30 @ 14:00) (85/59 - 137/86)  BP(mean): 74 (10-30 @ 14:00) (67 - 104)  RR: 29 (10-30 @ 14:00) (7 - 33)  SpO2: 99% (10-30 @ 14:00) (75% - 100%)    I&O's Summary    29 Oct 2024 07:01  -  30 Oct 2024 07:00  --------------------------------------------------------  IN: 650 mL / OUT: 1150 mL / NET: -500 mL        REVIEW OF SYSTEMS:  CONSTITUTIONAL: No weakness, fevers or chills  EYES: No visual changes  ENT: No vertigo or throat pain   NECK: No pain or stiffness  RESPIRATORY: No cough, wheezing, hemoptysis; No shortness of breath  CARDIOVASCULAR: No chest pain or palpitations  GASTROINTESTINAL: No abdominal or epigastric pain. No nausea, vomiting, or hematemesis; No diarrhea or constipation. No melena or hematochezia.  GENITOURINARY: No dysuria, frequency or hematuria  NEUROLOGICAL: No numbness or weakness  SKIN: No itching, no rashes  MSK: No pain    PHYSICAL EXAM:  NEURO: patient is awake , alert and oriented  GEN: Not in acute distress  NECK: no thyroid enlargement, no JVD  LUNGS: Clear to auscultation bilaterally   CARDIOVASCULAR: S1/S2 present, RRR , no murmurs or rubs, no carotid bruits,  + PP bilaterally  ABD: Soft, non-tender, non-distended, +BS  EXT: No EHSAN  SKIN: Intact    LABS:                        12.5   6.32  )-----------( 269      ( 30 Oct 2024 00:25 )             37.9     10-30    135  |  103  |  17  ----------------------------<  97  4.8   |  21[L]  |  0.75    Ca    8.6      30 Oct 2024 03:06  Phos  3.7     10-30  Mg     1.8     10-30    TPro  6.6  /  Alb  3.6  /  TBili  0.2  /  DBili  x   /  AST  30  /  ALT  26  /  AlkPhos  65  10-30      Lactate, Blood: 1.5 mmol/L (10-29-24 @ 18:35)    CARDIAC MARKERS ( 29 Oct 2024 12:47 )  x     / x     / x     / x     / 9.6 ng/mL        Troponin trend:            RADIOLOGY:  -CXR:  -TTE:  -CCTA:  -STRESS TEST:  -CATHETERIZATION:    ECG:    TELEMETRY EVENTS:

## 2024-10-30 NOTE — PROGRESS NOTE ADULT - ASSESSMENT
29 year old female with PMH of ARVC presenting from outpatient EP for ventricular tachycardia s/p cardioversion and planned for ablation tomorrow     Neurology  Propofol drip for cardioversion  Now off of all sedation   AO4      Cardiovascular   #Ventricular Tachycardia  s/p Lidocaine drip with reaction of screaming   - Sedated synchronized cardioversion  - Now in NSR  - planned for ablation TODAY with EP, NPO MN    #History of ARVC  On home metop succ, losartan,  leonila  - holding all medications     Respiratory  #No  acute issues   - Sating well on RA       Gastrointestinal   #No Acute issues   - Monitor for BM  - regular diet  - NPO MN for ablation     /Renal  #No acute issues   - SCr and BUN within normal limits   - Trend CMPs daily    Endocrine   #No acute issues     Infectious Disease   #Afebrile  WBCs within normal limits   - trend WBCs and monitor for fevers     Hematology  - H&H stable       Ethics   full code

## 2024-10-30 NOTE — PROGRESS NOTE ADULT - SUBJECTIVE AND OBJECTIVE BOX
24H hour events: pt without acute complaints. Maintained NSR overnight    MEDICATIONS:  chlorhexidine 2% Cloths 1 Application(s) Topical <User Schedule>      REVIEW OF SYSTEMS:  See HPI, otherwise ROS negative.    PHYSICAL EXAM:  T(C): 36.7 (10-30-24 @ 07:42), Max: 36.9 (10-29-24 @ 17:00)  HR: 63 (10-30-24 @ 07:42) (63 - 145)  BP: 133/85 (10-30-24 @ 07:42) (85/68 - 137/86)  RR: 16 (10-30-24 @ 07:42) (16 - 33)  SpO2: 100% (10-30-24 @ 07:42) (75% - 100%)  Wt(kg): --  I&O's Summary    29 Oct 2024 07:01  -  30 Oct 2024 07:00  --------------------------------------------------------  IN: 650 mL / OUT: 1150 mL / NET: -500 mL        Appearance: Alert. NAD	  HEENT:   NC/AT	  Cardiovascular: +S1S2 RRR   Respiratory: CTA B/L	  Psychiatry: A & O x 3, Mood & affect appropriate  Gastrointestinal:  Soft, NT.ND., + BS	  Skin: No rashes	  Neurologic: Non-focal  Extremities: No edema BLE      LABS:	 	    CBC Full  -  ( 30 Oct 2024 00:25 )  WBC Count : 6.32 K/uL  Hemoglobin : 12.5 g/dL  Hematocrit : 37.9 %  Platelet Count - Automated : 269 K/uL  Mean Cell Volume : 93.8 fl  Mean Cell Hemoglobin : 30.9 pg  Mean Cell Hemoglobin Concentration : 33.0 g/dL  Auto Neutrophil # : 2.94 K/uL  Auto Lymphocyte # : 2.76 K/uL  Auto Monocyte # : 0.47 K/uL  Auto Eosinophil # : 0.08 K/uL  Auto Basophil # : 0.06 K/uL  Auto Neutrophil % : 46.5 %  Auto Lymphocyte % : 43.7 %  Auto Monocyte % : 7.4 %  Auto Eosinophil % : 1.3 %  Auto Basophil % : 0.9 %    10-30    135  |  103  |  17  ----------------------------<  97  4.8   |  21[L]  |  0.75  10-30    131[L]  |  104  |  17  ----------------------------<  102[H]  6.9[HH]   |  19[L]  |  0.67    Ca    8.6      30 Oct 2024 03:06  Ca    8.2[L]      30 Oct 2024 00:27  Phos  3.7     10-30  Phos  3.7     10-30  Mg     1.8     10-30  Mg     1.8     10-30    TPro  6.6  /  Alb  3.6  /  TBili  0.2  /  DBili  x   /  AST  30  /  ALT  26  /  AlkPhos  65  10-30  TPro  6.7  /  Alb  3.3  /  TBili  0.4  /  DBili  x   /  AST  80[H]  /  ALT  28  /  AlkPhos  50  10-30    Thyroid Stimulating Hormone, Serum (10.29.24 @ 12:47)    Thyroid Stimulating Hormone, Serum: 4.42 uIU/mL    TELEMETRY: NSR 70s overnight  	  	  ASSESSMENT/PLAN:

## 2024-10-30 NOTE — PROGRESS NOTE ADULT - SUBJECTIVE AND OBJECTIVE BOX
Eliu Flores PGY-1    INTERVAL HPI/OVERNIGHT EVENTS:    SUBJECTIVE: Patient seen and examined at bedside.     ROS: Negative except as stated above.     OBJECTIVE:    VITAL SIGNS:  ICU Vital Signs Last 24 Hrs  T(C): 36.7 (30 Oct 2024 07:42), Max: 36.9 (29 Oct 2024 17:00)  T(F): 98 (30 Oct 2024 07:00), Max: 98.4 (29 Oct 2024 17:00)  HR: 63 (30 Oct 2024 07:42) (63 - 145)  BP: 133/85 (30 Oct 2024 07:42) (85/68 - 137/86)  BP(mean): 104 (30 Oct 2024 07:42) (72 - 104)  ABP: --  ABP(mean): --  RR: 16 (30 Oct 2024 07:42) (16 - 33)  SpO2: 100% (30 Oct 2024 07:42) (75% - 100%)    O2 Parameters below as of 30 Oct 2024 07:42    O2 Flow (L/min): 4            10-29 @ 07:01  -  10-30 @ 07:00  --------------------------------------------------------  IN: 650 mL / OUT: 600 mL / NET: 50 mL      CAPILLARY BLOOD GLUCOSE          PHYSICAL EXAM:    General: NAD  HEENT: NC/AT; PERRL, clear conjunctiva  Neck: supple  Respiratory: CTA b/l  Cardiovascular: +S1/S2; RRR  Abdomen: soft, NT/ND; +BS x4  Extremities: WWP, 2+ peripheral pulses b/l; no LE edema  Skin: normal color and turgor; no rash  Neurological:    MEDICATIONS:  MEDICATIONS  (STANDING):  chlorhexidine 2% Cloths 1 Application(s) Topical <User Schedule>    MEDICATIONS  (PRN):      ALLERGIES:  Allergies    No Known Allergies    Intolerances        LABS:                        12.5   6.32  )-----------( 269      ( 30 Oct 2024 00:25 )             37.9     10-30    135  |  103  |  17  ----------------------------<  97  4.8   |  21[L]  |  0.75    Ca    8.6      30 Oct 2024 03:06  Phos  3.7     10-30  Mg     1.8     10-30    TPro  6.6  /  Alb  3.6  /  TBili  0.2  /  DBili  x   /  AST  30  /  ALT  26  /  AlkPhos  65  10-30      Urinalysis Basic - ( 30 Oct 2024 03:06 )    Color: x / Appearance: x / SG: x / pH: x  Gluc: 97 mg/dL / Ketone: x  / Bili: x / Urobili: x   Blood: x / Protein: x / Nitrite: x   Leuk Esterase: x / RBC: x / WBC x   Sq Epi: x / Non Sq Epi: x / Bacteria: x        RADIOLOGY & ADDITIONAL TESTS: Reviewed. Eliu Flores PGY-1    INTERVAL HPI/OVERNIGHT EVENTS:    SUBJECTIVE: Patient seen and examined at bedside. Unable to asses at bedside this morning as patient has gone to cath lab for EP procedure     ROS: Negative except as stated above.     OBJECTIVE:    VITAL SIGNS:  ICU Vital Signs Last 24 Hrs  T(C): 36.7 (30 Oct 2024 07:42), Max: 36.9 (29 Oct 2024 17:00)  T(F): 98 (30 Oct 2024 07:00), Max: 98.4 (29 Oct 2024 17:00)  HR: 63 (30 Oct 2024 07:42) (63 - 145)  BP: 133/85 (30 Oct 2024 07:42) (85/68 - 137/86)  BP(mean): 104 (30 Oct 2024 07:42) (72 - 104)  ABP: --  ABP(mean): --  RR: 16 (30 Oct 2024 07:42) (16 - 33)  SpO2: 100% (30 Oct 2024 07:42) (75% - 100%)    O2 Parameters below as of 30 Oct 2024 07:42    O2 Flow (L/min): 4            10-29 @ 07:01  -  10-30 @ 07:00  --------------------------------------------------------  IN: 650 mL / OUT: 600 mL / NET: 50 mL      CAPILLARY BLOOD GLUCOSE          PHYSICAL EXAM:    General: NAD  HEENT: NC/AT; PERRL, clear conjunctiva  Neck: supple  Respiratory: CTA b/l  Cardiovascular: +S1/S2; RRR  Abdomen: soft, NT/ND; +BS x4  Extremities: WWP, 2+ peripheral pulses b/l; no LE edema  Skin: normal color and turgor; no rash  Neurological:    MEDICATIONS:  MEDICATIONS  (STANDING):  chlorhexidine 2% Cloths 1 Application(s) Topical <User Schedule>    MEDICATIONS  (PRN):      ALLERGIES:  Allergies    No Known Allergies    Intolerances        LABS:                        12.5   6.32  )-----------( 269      ( 30 Oct 2024 00:25 )             37.9     10-30    135  |  103  |  17  ----------------------------<  97  4.8   |  21[L]  |  0.75    Ca    8.6      30 Oct 2024 03:06  Phos  3.7     10-30  Mg     1.8     10-30    TPro  6.6  /  Alb  3.6  /  TBili  0.2  /  DBili  x   /  AST  30  /  ALT  26  /  AlkPhos  65  10-30      Urinalysis Basic - ( 30 Oct 2024 03:06 )    Color: x / Appearance: x / SG: x / pH: x  Gluc: 97 mg/dL / Ketone: x  / Bili: x / Urobili: x   Blood: x / Protein: x / Nitrite: x   Leuk Esterase: x / RBC: x / WBC x   Sq Epi: x / Non Sq Epi: x / Bacteria: x        RADIOLOGY & ADDITIONAL TESTS: Reviewed.

## 2024-10-30 NOTE — PROGRESS NOTE ADULT - ASSESSMENT
28 yo woman with PMHX of dilated RV with RVSD, found to have ARVC on genetic testing, S-ICD placement 3/8/2023, who presented to the ED from Dr Reese's office after being found to be in sustained VT on 10/29/24. EKG c/w ventricular tachycardia at 142bpm (LB, right axis, inferiorly directed with V4 transition). The patient was having palpitations and feeling generally unwell. She did have stable BP despite the VT. Lidocaine bolus 100mg IV was attempted x 2 in the ED without resolution of her VT. She was transported to CCU where she was successfully cardioverted to NSR with 200J synchronized DCCV.     #Sustained VT  #ARVC    Recommendations:  -Monitor on telemetry in CCU  -EPS + VT ablation this morning  -Active T&S  -TSH mildly elevated, check free T3/T4  -D/w primary team and EP attending

## 2024-10-30 NOTE — PROGRESS NOTE ADULT - ATTENDING COMMENTS
Patient with known ARVC and subcutaneous ICD presents with VT below ICD detection zone.  Admitted to CICU.  Patient successfully cardioverted to sinus rhythm on 10/29.  Plan for VT ablation on 10/30 per EP.

## 2024-10-30 NOTE — CONSULT NOTE ADULT - CRITICAL CARE ATTENDING COMMENT
29yrs, Known history of ARVD. Followed by Dr Clarke.   History of VT , s/p s/c ICD (2023)  No history of HF.  home meds: ald 50,, toprol 50, entresto low dose, farxega   TTE: Jan 2024; LVEF 58, LVEDD 4.4, RV normal size, reduced function TAPSE 2.3, mild TR. PASP 24.   Admitted now for lightheadedness, SOB and palpitations.   Found to be in WCT. 140. sent to ED. 2 X lido, DCCV 200j to SR.   planned for VT epicardial ablation today.   meds; off all meds.   HR 66SR, 137/-103/ afebrile   10-30    135  |  103  |  17  ----------------------------<  97  4.8   |  21[L]  |  0.75    Ca    8.6      30 Oct 2024 03:06  Phos  3.7     10-30  Mg     1.8     10-30    TPro  6.6  /  Alb  3.6  /  TBili  0.2  /  DBili  x   /  AST  30  /  ALT  26  /  AlkPhos  65  10-30                        12.5   6.32  )-----------( 269      ( 30 Oct 2024 00:25 )             37.9   Imp: history as above.   For VT ablation today.   Suggest:   Resume toprol and aldactone today. Rest of home medication tomorrow.   Yong Madrigal 29yrs, Known history of ARVD. Followed by Dr Clarke.   History of VT , s/p s/c ICD (2023)  No history of HF.  home meds: ald 25, toprol 25, losartan 25   TTE: Jan 2024; LVEF 58, LVEDD 4.4, RV normal size, reduced function TAPSE 2.3, mild TR. PASP 24.   Admitted now for lightheadedness, SOB and palpitations.   Found to be in WCT. 140. sent to ED. 2 X lido, DCCV 200j to SR.   Now s/p VT ablation  meds; off all meds.   HR 66SR, 137/-103/ afebrile   10-30    135  |  103  |  17  ----------------------------<  97  4.8   |  21[L]  |  0.75    Ca    8.6      30 Oct 2024 03:06  Phos  3.7     10-30  Mg     1.8     10-30    TPro  6.6  /  Alb  3.6  /  TBili  0.2  /  DBili  x   /  AST  30  /  ALT  26  /  AlkPhos  65  10-30                        12.5   6.32  )-----------( 269      ( 30 Oct 2024 00:25 )             37.9   Imp: history as above.     Suggest:   Resume toprol and aldactone today. Losartan from tomorrow   Yong Madrigal

## 2024-10-31 ENCOUNTER — TRANSCRIPTION ENCOUNTER (OUTPATIENT)
Age: 29
End: 2024-10-31

## 2024-10-31 VITALS
OXYGEN SATURATION: 98 % | SYSTOLIC BLOOD PRESSURE: 113 MMHG | TEMPERATURE: 98 F | DIASTOLIC BLOOD PRESSURE: 81 MMHG | RESPIRATION RATE: 18 BRPM | HEART RATE: 65 BPM

## 2024-10-31 DIAGNOSIS — F41.9 ANXIETY DISORDER, UNSPECIFIED: ICD-10-CM

## 2024-10-31 DIAGNOSIS — I42.8 OTHER CARDIOMYOPATHIES: ICD-10-CM

## 2024-10-31 DIAGNOSIS — I47.20 VENTRICULAR TACHYCARDIA, UNSPECIFIED: ICD-10-CM

## 2024-10-31 LAB
ALBUMIN SERPL ELPH-MCNC: 3.6 G/DL — SIGNIFICANT CHANGE UP (ref 3.3–5)
ALP SERPL-CCNC: 71 U/L — SIGNIFICANT CHANGE UP (ref 40–120)
ALT FLD-CCNC: 26 U/L — SIGNIFICANT CHANGE UP (ref 10–45)
ANION GAP SERPL CALC-SCNC: 12 MMOL/L — SIGNIFICANT CHANGE UP (ref 5–17)
AST SERPL-CCNC: 31 U/L — SIGNIFICANT CHANGE UP (ref 10–40)
BASOPHILS # BLD AUTO: 0.07 K/UL — SIGNIFICANT CHANGE UP (ref 0–0.2)
BASOPHILS NFR BLD AUTO: 0.9 % — SIGNIFICANT CHANGE UP (ref 0–2)
BILIRUB SERPL-MCNC: 0.1 MG/DL — LOW (ref 0.2–1.2)
BUN SERPL-MCNC: 13 MG/DL — SIGNIFICANT CHANGE UP (ref 7–23)
CALCIUM SERPL-MCNC: 8.8 MG/DL — SIGNIFICANT CHANGE UP (ref 8.4–10.5)
CHLORIDE SERPL-SCNC: 104 MMOL/L — SIGNIFICANT CHANGE UP (ref 96–108)
CO2 SERPL-SCNC: 21 MMOL/L — LOW (ref 22–31)
CREAT SERPL-MCNC: 0.71 MG/DL — SIGNIFICANT CHANGE UP (ref 0.5–1.3)
EGFR: 119 ML/MIN/1.73M2 — SIGNIFICANT CHANGE UP
EOSINOPHIL # BLD AUTO: 0.13 K/UL — SIGNIFICANT CHANGE UP (ref 0–0.5)
EOSINOPHIL NFR BLD AUTO: 1.7 % — SIGNIFICANT CHANGE UP (ref 0–6)
GLUCOSE SERPL-MCNC: 124 MG/DL — HIGH (ref 70–99)
HCT VFR BLD CALC: 39.3 % — SIGNIFICANT CHANGE UP (ref 34.5–45)
HGB BLD-MCNC: 12.7 G/DL — SIGNIFICANT CHANGE UP (ref 11.5–15.5)
IMM GRANULOCYTES NFR BLD AUTO: 0.4 % — SIGNIFICANT CHANGE UP (ref 0–0.9)
LYMPHOCYTES # BLD AUTO: 2.89 K/UL — SIGNIFICANT CHANGE UP (ref 1–3.3)
LYMPHOCYTES # BLD AUTO: 37.3 % — SIGNIFICANT CHANGE UP (ref 13–44)
MAGNESIUM SERPL-MCNC: 1.9 MG/DL — SIGNIFICANT CHANGE UP (ref 1.6–2.6)
MCHC RBC-ENTMCNC: 30.5 PG — SIGNIFICANT CHANGE UP (ref 27–34)
MCHC RBC-ENTMCNC: 32.3 G/DL — SIGNIFICANT CHANGE UP (ref 32–36)
MCV RBC AUTO: 94.5 FL — SIGNIFICANT CHANGE UP (ref 80–100)
MONOCYTES # BLD AUTO: 0.46 K/UL — SIGNIFICANT CHANGE UP (ref 0–0.9)
MONOCYTES NFR BLD AUTO: 5.9 % — SIGNIFICANT CHANGE UP (ref 2–14)
NEUTROPHILS # BLD AUTO: 4.16 K/UL — SIGNIFICANT CHANGE UP (ref 1.8–7.4)
NEUTROPHILS NFR BLD AUTO: 53.8 % — SIGNIFICANT CHANGE UP (ref 43–77)
NRBC # BLD: 0 /100 WBCS — SIGNIFICANT CHANGE UP (ref 0–0)
PHOSPHATE SERPL-MCNC: 3.3 MG/DL — SIGNIFICANT CHANGE UP (ref 2.5–4.5)
PLATELET # BLD AUTO: 269 K/UL — SIGNIFICANT CHANGE UP (ref 150–400)
POTASSIUM SERPL-MCNC: 4.1 MMOL/L — SIGNIFICANT CHANGE UP (ref 3.5–5.3)
POTASSIUM SERPL-SCNC: 4.1 MMOL/L — SIGNIFICANT CHANGE UP (ref 3.5–5.3)
PROT SERPL-MCNC: 6.6 G/DL — SIGNIFICANT CHANGE UP (ref 6–8.3)
RBC # BLD: 4.16 M/UL — SIGNIFICANT CHANGE UP (ref 3.8–5.2)
RBC # FLD: 12.5 % — SIGNIFICANT CHANGE UP (ref 10.3–14.5)
SODIUM SERPL-SCNC: 137 MMOL/L — SIGNIFICANT CHANGE UP (ref 135–145)
WBC # BLD: 7.74 K/UL — SIGNIFICANT CHANGE UP (ref 3.8–10.5)
WBC # FLD AUTO: 7.74 K/UL — SIGNIFICANT CHANGE UP (ref 3.8–10.5)

## 2024-10-31 PROCEDURE — C1894: CPT

## 2024-10-31 PROCEDURE — C1759: CPT

## 2024-10-31 PROCEDURE — 93010 ELECTROCARDIOGRAM REPORT: CPT

## 2024-10-31 PROCEDURE — 85018 HEMOGLOBIN: CPT

## 2024-10-31 PROCEDURE — 80053 COMPREHEN METABOLIC PANEL: CPT

## 2024-10-31 PROCEDURE — 93623 PRGRMD STIMJ&PACG IV RX NFS: CPT

## 2024-10-31 PROCEDURE — 86870 RBC ANTIBODY IDENTIFICATION: CPT

## 2024-10-31 PROCEDURE — 84481 FREE ASSAY (FT-3): CPT

## 2024-10-31 PROCEDURE — 96360 HYDRATION IV INFUSION INIT: CPT

## 2024-10-31 PROCEDURE — 86850 RBC ANTIBODY SCREEN: CPT

## 2024-10-31 PROCEDURE — C1766: CPT

## 2024-10-31 PROCEDURE — 83880 ASSAY OF NATRIURETIC PEPTIDE: CPT

## 2024-10-31 PROCEDURE — 84443 ASSAY THYROID STIM HORMONE: CPT

## 2024-10-31 PROCEDURE — 85014 HEMATOCRIT: CPT

## 2024-10-31 PROCEDURE — 93005 ELECTROCARDIOGRAM TRACING: CPT

## 2024-10-31 PROCEDURE — 84702 CHORIONIC GONADOTROPIN TEST: CPT

## 2024-10-31 PROCEDURE — 86880 COOMBS TEST DIRECT: CPT

## 2024-10-31 PROCEDURE — 82435 ASSAY OF BLOOD CHLORIDE: CPT

## 2024-10-31 PROCEDURE — 99223 1ST HOSP IP/OBS HIGH 75: CPT

## 2024-10-31 PROCEDURE — 99233 SBSQ HOSP IP/OBS HIGH 50: CPT

## 2024-10-31 PROCEDURE — 36415 COLL VENOUS BLD VENIPUNCTURE: CPT

## 2024-10-31 PROCEDURE — 76937 US GUIDE VASCULAR ACCESS: CPT

## 2024-10-31 PROCEDURE — 84439 ASSAY OF FREE THYROXINE: CPT

## 2024-10-31 PROCEDURE — 85025 COMPLETE CBC W/AUTO DIFF WBC: CPT

## 2024-10-31 PROCEDURE — 84132 ASSAY OF SERUM POTASSIUM: CPT

## 2024-10-31 PROCEDURE — 84100 ASSAY OF PHOSPHORUS: CPT

## 2024-10-31 PROCEDURE — 93654 COMPRE EP EVAL TX VT: CPT

## 2024-10-31 PROCEDURE — C1732: CPT

## 2024-10-31 PROCEDURE — 93308 TTE F-UP OR LMTD: CPT

## 2024-10-31 PROCEDURE — 82553 CREATINE MB FRACTION: CPT

## 2024-10-31 PROCEDURE — 84295 ASSAY OF SERUM SODIUM: CPT

## 2024-10-31 PROCEDURE — 86901 BLOOD TYPING SEROLOGIC RH(D): CPT

## 2024-10-31 PROCEDURE — 86922 COMPATIBILITY TEST ANTIGLOB: CPT

## 2024-10-31 PROCEDURE — 82550 ASSAY OF CK (CPK): CPT

## 2024-10-31 PROCEDURE — 83605 ASSAY OF LACTIC ACID: CPT

## 2024-10-31 PROCEDURE — 82330 ASSAY OF CALCIUM: CPT

## 2024-10-31 PROCEDURE — C1730: CPT

## 2024-10-31 PROCEDURE — 99285 EMERGENCY DEPT VISIT HI MDM: CPT | Mod: 25

## 2024-10-31 PROCEDURE — 82947 ASSAY GLUCOSE BLOOD QUANT: CPT

## 2024-10-31 PROCEDURE — 84484 ASSAY OF TROPONIN QUANT: CPT

## 2024-10-31 PROCEDURE — 82803 BLOOD GASES ANY COMBINATION: CPT

## 2024-10-31 PROCEDURE — 86900 BLOOD TYPING SEROLOGIC ABO: CPT

## 2024-10-31 PROCEDURE — 83735 ASSAY OF MAGNESIUM: CPT

## 2024-10-31 PROCEDURE — 93662 INTRACARDIAC ECG (ICE): CPT

## 2024-10-31 RX ORDER — ATOMOXETINE HYDROCHLORIDE 18 MG/1
1 CAPSULE ORAL
Refills: 0 | DISCHARGE

## 2024-10-31 RX ORDER — METOPROLOL TARTRATE 50 MG
2 TABLET ORAL
Qty: 0 | Refills: 0 | DISCHARGE

## 2024-10-31 RX ORDER — LOSARTAN POTASSIUM 25 MG/1
25 TABLET ORAL DAILY
Refills: 0 | Status: DISCONTINUED | OUTPATIENT
Start: 2024-10-31 | End: 2024-10-31

## 2024-10-31 RX ORDER — MAGNESIUM SULFATE IN 0.9% NACL 2 G/50 ML
2 INTRAVENOUS SOLUTION, PIGGYBACK (ML) INTRAVENOUS ONCE
Refills: 0 | Status: COMPLETED | OUTPATIENT
Start: 2024-10-31 | End: 2024-10-31

## 2024-10-31 RX ORDER — SERTRALINE HYDROCHLORIDE 50 MG/1
100 TABLET, FILM COATED ORAL DAILY
Refills: 0 | Status: DISCONTINUED | OUTPATIENT
Start: 2024-10-31 | End: 2024-10-31

## 2024-10-31 RX ADMIN — CHLORHEXIDINE GLUCONATE 1 APPLICATION(S): 40 SOLUTION TOPICAL at 06:09

## 2024-10-31 RX ADMIN — Medication 25 MILLIGRAM(S): at 06:02

## 2024-10-31 RX ADMIN — LOSARTAN POTASSIUM 25 MILLIGRAM(S): 25 TABLET ORAL at 13:11

## 2024-10-31 RX ADMIN — Medication 25 GRAM(S): at 06:02

## 2024-10-31 RX ADMIN — HEPARIN SODIUM 5000 UNIT(S): 10000 INJECTION INTRAVENOUS; SUBCUTANEOUS at 06:02

## 2024-10-31 RX ADMIN — SERTRALINE HYDROCHLORIDE 100 MILLIGRAM(S): 50 TABLET, FILM COATED ORAL at 13:11

## 2024-10-31 RX ADMIN — HEPARIN SODIUM 5000 UNIT(S): 10000 INJECTION INTRAVENOUS; SUBCUTANEOUS at 13:10

## 2024-10-31 RX ADMIN — Medication 50 MILLIGRAM(S): at 06:02

## 2024-10-31 NOTE — DISCHARGE NOTE NURSING/CASE MANAGEMENT/SOCIAL WORK - FINANCIAL ASSISTANCE
[facility] provides services at a reduced cost to those who are determined to be eligible through [facility]’s financial assistance program. Information regarding [facility]’s financial assistance program can be found by going to [email or web address] or by calling [phone].

## 2024-10-31 NOTE — DISCHARGE NOTE PROVIDER - CARE PROVIDER_API CALL
Jeferson Marshall  Cardiac Electrophysiology  [address] [place] [id number]  Phone: [phone]  Fax: [phone]  Established Patient  Scheduled Appointment: 12/13/2024 08:00 AM   Jeferson Marshall  Cardiac Electrophysiology  [address] [place] [id number]  Phone: [phone]  Fax: [phone]  Established Patient  Scheduled Appointment: 12/13/2024 08:00 AM    Radha Coelho  NP in Adult Health [address] [place] [address]  Phone: [phone]  Fax: [phone]  Follow Up Time: 2 weeks

## 2024-10-31 NOTE — PROGRESS NOTE ADULT - SUBJECTIVE AND OBJECTIVE BOX
Coleman Mccall MD  [facility] Division of Hospital Medicine  Available via MS Teams      HPI:   29-year-old female past medical history of AICD in the setting of ARVD presenting for ventricular tachycardia.  Per patient on Sunday she began to have symptoms of chest tightness sweating and palpation. She thought at the time that symptoms were due to indigestion. On Monday symptoms persisted and worsened without relief and on Monday night she sweat through her sheets  Patient presented to outpatient electrophysiologist office Dr. Reese this morning who sent her to the ED after and EKG that showed an abnormal rhythm. In the ED she was given lidocaine boluses and had a bad reaction that led to sedation and screaming reaction. Patient was transferred to the CICU received  synchronized cardioversion    ED NS bolus lidocaine TTE , slow VT has 2 lidocaine boluses but did not react well    LABS - troponin 167, BMP 8368 CB VBG 7.30 CO2 52 Bicarb 36  No imaging, cardioverted to sinus under sedation to sinus rhythms  (29 Oct 2024 14:30)    S/p cardioversion in CCU and VT ablation. pt is now in sinus rhythm. Pt feeling well, denies any acute complaints except anxiety (missed her home meds).     PAST MEDICAL & SURGICAL HISTORY:  Arrhythmogenic right ventricular dysplasia      H/O low back pain      H/O acne      Anxiety and depression      H/O female dyspareunia      Dyspnea on exertion      H/O eczema      Intertrigo      Nonsustained paroxysmal ventricular tachycardia      History of implantable cardiac defibrillator (ICD)          Review of Systems:       Allergies    No Known Allergies    Intolerances        Social History:     FAMILY HISTORY:  Family history of cardiomyopathy (Father)        MEDICATIONS  (STANDING):  chlorhexidine 2% Cloths 1 Application(s) Topical <User Schedule>  heparin   Injectable 5000 Unit(s) SubCutaneous every 8 hours  losartan 25 milliGRAM(s) Oral daily  metoprolol succinate ER 50 milliGRAM(s) Oral daily  spironolactone 25 milliGRAM(s) Oral daily    MEDICATIONS  (PRN):    Home Medications:  losartan 25 mg oral tablet: 1 tab(s) orally once a day (29 Oct 2024 18:00)  metoprolol succinate 25 mg oral tablet, extended release: 1 tab(s) orally once a day (29 Oct 2024 17:59)  Multiple Vitamins oral tablet: 1 tab(s) orally once a day (29 Oct 2024 18:00)  spironolactone 25 mg oral tablet: 1 tab(s) orally once a day  for acne (29 Oct 2024 18:00)  Zoloft 50 mg oral tablet: 1 tab(s) orally 2 times a day as needed for  anxiety (29 Oct 2024 18:00)      CAPILLARY BLOOD GLUCOSE        I&O's Summary    30 Oct 2024 07:01  -  31 Oct 2024 07:00  --------------------------------------------------------  IN: 840 mL / OUT: 0 mL / NET: 840 mL        Physical Exam:  Vital Signs Last 24 Hrs  T(C): 36.9 (31 Oct 2024 05:13), Max: 36.9 (31 Oct 2024 05:13)  T(F): 98.4 (31 Oct 2024 05:13), Max: 98.4 (31 Oct 2024 05:13)  HR: 63 (31 Oct 2024 05:13) (63 - 82)  BP: 97/71 (31 Oct 2024 05:13) (85/59 - 121/69)  BP(mean): 79 (31 Oct 2024 01:00) (67 - 90)  RR: 18 (31 Oct 2024 05:13) (10 - 29)  SpO2: 98% (31 Oct 2024 05:13) (97% - 100%)    Parameters below as of 31 Oct 2024 05:13  Patient On (Oxygen Delivery Method): room air    CONSTITUTIONAL: NAD, well-developed, well-groomed  NECK: Supple, no palpable masses; no thyromegaly  RESPIRATORY: Normal respiratory effort; lungs are clear to auscultation bilaterally  CARDIOVASCULAR: normal S1 and S2, no murmur/rub/gallop; No lower extremity edema  ABDOMEN: Nontender to palpation, normoactive bowel sounds, no rebound/guarding  MUSCULOSKELETAL:  no clubbing or cyanosis of digits; no joint swelling or tenderness to palpation  PSYCH: A+O to person, place, and time; affect appropriate  NEUROLOGY: CN 2-12 are intact and symmetric; no gross sensory deficits   SKIN: No rashes; no palpable lesions    LABS:                        12.7   7.74  )-----------( 269      ( 31 Oct 2024 00:29 )             39.3     10-31    137  |  104  |  13  ----------------------------<  124[H]  4.1   |  21[L]  |  0.71    Ca    8.8      31 Oct 2024 00:29  Phos  3.3     10-31  Mg     1.9     10-31    TPro  6.6  /  Alb  3.6  /  TBili  0.1[L]  /  DBili  x   /  AST  31  /  ALT  26  /  AlkPhos  71  10-31      CARDIAC MARKERS ( 29 Oct 2024 12:47 )  x     / x     / x     / x     / 9.6 ng/mL      Urinalysis Basic - ( 31 Oct 2024 00:29 )    Color: x / Appearance: x / SG: x / pH: x  Gluc: 124 mg/dL / Ketone: x  / Bili: x / Urobili: x   Blood: x / Protein: x / Nitrite: x   Leuk Esterase: x / RBC: x / WBC x   Sq Epi: x / Non Sq Epi: x / Bacteria: x          RADIOLOGY & ADDITIONAL TESTS:  Results Reviewed:   Imaging Personally Reviewed:  Electrocardiogram Personally Reviewed: Vtach on admission, now NSR    COORDINATION OF CARE:  Care Discussed with Consultants/Other Providers [Y/N]:  Prior or Outpatient Records Reviewed [Y/N]:

## 2024-10-31 NOTE — DISCHARGE NOTE PROVIDER - NSDCFUSCHEDAPPT_GEN_ALL_CORE_FT
Piedad Clarke  [facility]  HEARTFAIL [address]  Scheduled Appointment: 11/08/2024    [facility]  ELECTROPH [address]  Scheduled Appointment: 12/03/2024    Jeferson Marshall  [facility]  ELECTROPH [address]  Scheduled Appointment: 12/13/2024     [facility]  ELECTROPH [address]  Scheduled Appointment: 12/03/2024    Jeferson Marshall  [facility]  ELECTROPH [address]  Scheduled Appointment: 12/13/2024

## 2024-10-31 NOTE — PROGRESS NOTE ADULT - SUBJECTIVE AND OBJECTIVE BOX
24H hour events: s/p VT ablation with Dr. Marshall 10/30/24  No events on tele overnight     MEDICATIONS:  heparin   Injectable 5000 Unit(s) SubCutaneous every 8 hours  metoprolol succinate ER 50 milliGRAM(s) Oral daily  spironolactone 25 milliGRAM(s) Oral daily    chlorhexidine 2% Cloths 1 Application(s) Topical <User Schedule>      REVIEW OF SYSTEMS:  See HPI, otherwise ROS negative.    PHYSICAL EXAM:  T(C): 36.9 (10-31-24 @ 05:13), Max: 36.9 (10-31-24 @ 05:13)  HR: 63 (10-31-24 @ 05:13) (63 - 82)  BP: 97/71 (10-31-24 @ 05:13) (85/59 - 121/69)  RR: 18 (10-31-24 @ 05:13) (10 - 29)  SpO2: 98% (10-31-24 @ 05:13) (97% - 100%)  Wt(kg): --  I&O's Summary    30 Oct 2024 07:01  -  31 Oct 2024 07:00  --------------------------------------------------------  IN: 840 mL / OUT: 0 mL / NET: 840 mL        Appearance: Alert. NAD	  Cardiovascular: +S1S2 RRR no m/g/r  Respiratory: CTA B/L	  Psychiatry: A & O x 3, Mood & affect appropriate  Gastrointestinal:  Soft, NT. ND. +BS	  Skin: No rashes	masses or lesions   Groin site cdi w/o hematoma   Neurologic: Non-focal  Extremities: No edema BLE  Vascular: Peripheral pulses palpable 2+ bilaterally      LABS:	 	    CBC Full  -  ( 31 Oct 2024 00:29 )  WBC Count : 7.74 K/uL  Hemoglobin : 12.7 g/dL  Hematocrit : 39.3 %  Platelet Count - Automated : 269 K/uL  Mean Cell Volume : 94.5 fl  Mean Cell Hemoglobin : 30.5 pg  Mean Cell Hemoglobin Concentration : 32.3 g/dL  Auto Neutrophil # : 4.16 K/uL  Auto Lymphocyte # : 2.89 K/uL  Auto Monocyte # : 0.46 K/uL  Auto Eosinophil # : 0.13 K/uL  Auto Basophil # : 0.07 K/uL  Auto Neutrophil % : 53.8 %  Auto Lymphocyte % : 37.3 %  Auto Monocyte % : 5.9 %  Auto Eosinophil % : 1.7 %  Auto Basophil % : 0.9 %    10-31    137  |  104  |  13  ----------------------------<  124[H]  4.1   |  21[L]  |  0.71  10-30    135  |  103  |  17  ----------------------------<  97  4.8   |  21[L]  |  0.75    Ca    8.8      31 Oct 2024 00:29  Ca    8.6      30 Oct 2024 03:06  Phos  3.3     10-31  Phos  3.7     10-30  Mg     1.9     10-31  Mg     1.8     10-30    TPro  6.6  /  Alb  3.6  /  TBili  0.1[L]  /  DBili  x   /  AST  31  /  ALT  26  /  AlkPhos  71  10-31  TPro  6.6  /  Alb  3.6  /  TBili  0.2  /  DBili  x   /  AST  30  /  ALT  26  /  AlkPhos  65  10-30      TSH: Thyroid Stimulating Hormone, Serum: 4.42 uIU/mL (10.29.24 @ 12:47)      TELEMETRY: NSR overnight, mechanical RBBB resolved

## 2024-10-31 NOTE — PROGRESS NOTE ADULT - ASSESSMENT
30 yo woman with PMHX of dilated RV with RVSD, found to have ARVC on genetic testing, S-ICD placement 3/8/2023, who presented to the ED from Dr Reese's office after being found to be in sustained VT on 10/29/24. EKG c/w ventricular tachycardia at 142bpm (LB, right axis, inferiorly directed with V4 transition). The patient was having palpitations and feeling generally unwell. She did have stable BP despite the VT. Lidocaine bolus 100mg IV was attempted x 2 in the ED without resolution of her VT. She was transported to CCU where she was successfully cardioverted to NSR with 200J synchronized DCCV.     #Sustained VT  #ARVC    Recommendations:  -S/p VT ablation with Dr. Marshall 10/30 of VT in free wall RVOT, terminated with ablation and noninducible after the case. Mechanical RBBB resolved as per tele this AM, she was electrically quiet overnight  -Can continue Toprol   -TSH mildly elevated, thyroxine wnl  -No further EP intervention, will sign off. Post op appointment 12/13/24 8AM. Reconsult PRN

## 2024-10-31 NOTE — PROGRESS NOTE ADULT - ASSESSMENT
29F with ARVC s/p AICD presented with Vtach s/p cardioversion in CCU and VT ablation. Now on floors.     # VT  # ARVC  - s/p synchronized cardioversion 200J DCCV, VT ablation now in sinus rhythm  - continue toprol  - Post op appointment 12/13/24 8AM     # Reduced LV function  - HF consult appreciated  - c/w toprol, aldactone, resume losartan if bp tolerates    # Anxiety/depression  - c/w sertraline 100 mg daily  - STOP home atomoxetine as this can increase risk of ventricular arrhythmias 29F with ARVC s/p AICD presented with Vtach s/p cardioversion in CCU and VT ablation. Now on floors.     # VT  - s/p synchronized cardioversion 200J DCCV, VT ablation now in sinus rhythm  - continue toprol  - Post op appointment 12/13/24 8AM     # ARVC  - HF consult appreciated  - c/w toprol, aldactone, resume losartan if bp tolerates    # Anxiety/depression  - c/w sertraline 100 mg daily  - STOP home atomoxetine as this can increase risk of ventricular arrhythmias

## 2024-10-31 NOTE — DISCHARGE NOTE PROVIDER - NSDCFUADDAPPT_GEN_ALL_CORE_FT
APPTS ARE READY TO BE MADE: [X] YES    Best Family or Patient Contact (if needed):    Additional Information about above appointments (if needed):    1: medicine  2:   3:     Other comments or requests:    APPTS ARE READY TO BE MADE: [X] YES    Best Family or Patient Contact (if needed):    Additional Information about above appointments (if needed):    1: medicine  2:   3:     Other comments or requests:     Patient informed us they already have secured a follow up appointment which is not visible on [facility] on 11/7 at 1pm with np priyank cowan at [address] [place] [address]    Prior to outreaching the patient, it was visible that the patient has secured a follow up appointment which was not scheduled by our team on 12/3 at 830am at [address] [facility]

## 2024-10-31 NOTE — PROGRESS NOTE ADULT - NS ATTEND AMEND GEN_ALL_CORE FT
Patient seen at bedside this am. No VT on Tele. Right groin C/D/I. We discussed that ARVC is a progressive disease - risk of recurrent ventricular arrhythmias. Outpatient follow-up. No further workup from an EP perspective as an inpatient.

## 2024-10-31 NOTE — DISCHARGE NOTE NURSING/CASE MANAGEMENT/SOCIAL WORK - NSDCFUADDAPPT_GEN_ALL_CORE_FT
APPTS ARE READY TO BE MADE: [X] YES    Best Family or Patient Contact (if needed):    Additional Information about above appointments (if needed):    1: medicine  2:   3:     Other comments or requests:

## 2024-10-31 NOTE — DISCHARGE NOTE PROVIDER - NSDCMRMEDTOKEN_GEN_ALL_CORE_FT
losartan 25 mg oral tablet: 1 tab(s) orally once a day  metoprolol succinate 25 mg oral tablet, extended release: 2 tab(s) orally once a day  Multiple Vitamins oral tablet: 1 tab(s) orally once a day  spironolactone 25 mg oral tablet: 1 tab(s) orally once a day  for acne  Zoloft 50 mg oral tablet: 1 tab(s) orally 2 times a day

## 2024-10-31 NOTE — DISCHARGE NOTE PROVIDER - HOSPITAL COURSE
HPI:   29-year-old female past medical history of AICD in the setting of ARVD presenting for ventricular tachycardia.  Per patient on Sunday she began to have symptoms of chest tightness sweating and palpation. She thought at the time that symptoms were due to indigestion. On Monday symptoms persisted and worsened without relief and on Monday night she sweat through her sheets  Patient presented to outpatient electrophysiologist office Dr. Reese this morning who sent her to the ED after and EKG that showed an abnormal rhythm. In the ED she was given lidocaine boluses and had a bad reaction that led to sedation and screaming reaction. Patient was transferred to the CICU received  synchronized cardioversion    ED NS bolus lidocaine TTE , slow VT has 2 lidocaine boluses but did not react well    LABS - troponin 167, BMP 8368 CB VBG 7.30 CO2 52 Bicarb 36  No imaging, cardioverted to sinus under sedation to sinus rhythms  (29 Oct 2024 14:30)    Hospital Course: S/p cardioversion in CCU, s/p VT ablation. Now in sinus rhythm, feeling well.     Important Medication Changes and Reason: stop atomoxetine    Active or Pending Issues Requiring Follow-up:     Advanced Directives:   [X] Full code  [ ] DNR  [ ] Hospice    Discharge Diagnoses:  ventricular tachycardia  reduced LV function  anxiety/depression HPI:   29-year-old female past medical history of AICD in the setting of ARVD presenting for ventricular tachycardia.  Per patient on Sunday she began to have symptoms of chest tightness sweating and palpation. She thought at the time that symptoms were due to indigestion. On Monday symptoms persisted and worsened without relief and on Monday night she sweat through her sheets  Patient presented to outpatient electrophysiologist office Dr. Reese this morning who sent her to the ED after and EKG that showed an abnormal rhythm. In the ED she was given lidocaine boluses and had a bad reaction that led to sedation and screaming reaction. Patient was transferred to the CICU received  synchronized cardioversion    ED NS bolus lidocaine TTE , slow VT has 2 lidocaine boluses but did not react well    LABS - troponin 167, BMP 8368 CB VBG 7.30 CO2 52 Bicarb 36  No imaging, cardioverted to sinus under sedation to sinus rhythms       Hospital Course: S/p cardioversion in CCU, s/p VT ablation. Now in sinus rhythm, feeling well.     Important Medication Changes and Reason: stop atomoxetine    Active or Pending Issues Requiring Follow-up:     Advanced Directives:   [X] Full code  [ ] DNR  [ ] Hospice    Discharge Diagnoses:  ventricular tachycardia  reduced LV function  anxiety/depression

## 2024-10-31 NOTE — DISCHARGE NOTE PROVIDER - CARE PROVIDERS DIRECT ADDRESSES
,[email or web address]

## 2024-10-31 NOTE — DISCHARGE NOTE PROVIDER - NSDCCPCAREPLAN_GEN_ALL_CORE_FT
PRINCIPAL DISCHARGE DIAGNOSIS  Diagnosis: Ventricular tachycardia  Assessment and Plan of Treatment: You had ventricular tachycardia which can be a fatal rhythm. You underwent cardioversion to convert your heart rhythm back to normal. You underwent VT ablation to prevent this from happening again. Follow-up with Dr. Marshall 12/13/2024.      SECONDARY DISCHARGE DIAGNOSES  Diagnosis: Anxiety and depression  Assessment and Plan of Treatment: You are on sertraline and atomoxetine at home. STOP taking atomoxetine as this increases your risk of ventricular arrythmias. Follow-up with your psychiatrist for further management.    Diagnosis: Arrhythmogenic right ventricular cardiomyopathy  Assessment and Plan of Treatment: You have a history of heart arrythmias causing heart damage. Continue to take toprol and spironolactone. You may resume taking losartan if your BP > 105/55. If you experience low BP (less than 100/50) or dizziness, stop taking this medication and follow-up with your cardiologist.

## 2024-10-31 NOTE — DISCHARGE NOTE PROVIDER - PROVIDER TOKENS
PROVIDER:[TOKEN:[[id number]],SCHEDULEDAPPT:[12/13/2024],SCHEDULEDAPPTTIME:[08:00 AM],ESTABLISHEDPATIENT:[T]] PROVIDER:[TOKEN:[[id number]],SCHEDULEDAPPT:[12/13/2024],SCHEDULEDAPPTTIME:[08:00 AM],ESTABLISHEDPATIENT:[T]],PROVIDER:[TOKEN:[[id number]],FOLLOWUP:[2 weeks]]

## 2024-10-31 NOTE — DISCHARGE NOTE NURSING/CASE MANAGEMENT/SOCIAL WORK - PATIENT PORTAL LINK FT
You can access the FollowMyHealth Patient Portal offered by [facility] by registering at the following website: [email or web address]. By joining [employer]’s FollowMyHealth portal, you will also be able to view your health information using other applications (apps) compatible with our system.

## 2024-11-01 ENCOUNTER — NON-APPOINTMENT (OUTPATIENT)
Age: 29
End: 2024-11-01

## 2024-11-08 ENCOUNTER — APPOINTMENT (OUTPATIENT)
Dept: HEART FAILURE | Facility: CLINIC | Age: 29
End: 2024-11-08

## 2024-11-08 VITALS
OXYGEN SATURATION: 99 % | TEMPERATURE: 97.8 F | WEIGHT: 187 LBS | BODY MASS INDEX: 33.13 KG/M2 | HEART RATE: 82 BPM | HEIGHT: 63 IN | SYSTOLIC BLOOD PRESSURE: 119 MMHG | DIASTOLIC BLOOD PRESSURE: 82 MMHG

## 2024-11-08 DIAGNOSIS — I42.8 OTHER CARDIOMYOPATHIES: ICD-10-CM

## 2024-11-08 PROCEDURE — G2211 COMPLEX E/M VISIT ADD ON: CPT | Mod: NC

## 2024-11-08 PROCEDURE — 99214 OFFICE O/P EST MOD 30 MIN: CPT

## 2024-11-08 RX ORDER — LOSARTAN POTASSIUM 25 MG/1
25 TABLET, FILM COATED ORAL DAILY
Refills: 0 | Status: DISCONTINUED | COMMUNITY
Start: 2024-11-08 | End: 2024-11-08

## 2024-11-08 RX ORDER — SACUBITRIL AND VALSARTAN 24; 26 MG/1; MG/1
24-26 TABLET, FILM COATED ORAL TWICE DAILY
Qty: 180 | Refills: 1 | Status: ACTIVE | COMMUNITY
Start: 2024-11-08 | End: 1900-01-01

## 2024-11-11 ENCOUNTER — NON-APPOINTMENT (OUTPATIENT)
Age: 29
End: 2024-11-11

## 2024-11-19 ENCOUNTER — TRANSCRIPTION ENCOUNTER (OUTPATIENT)
Age: 29
End: 2024-11-19

## 2024-12-03 ENCOUNTER — APPOINTMENT (OUTPATIENT)
Dept: ELECTROPHYSIOLOGY | Facility: CLINIC | Age: 29
End: 2024-12-03

## 2024-12-03 ENCOUNTER — NON-APPOINTMENT (OUTPATIENT)
Age: 29
End: 2024-12-03

## 2024-12-03 PROCEDURE — 93296 REM INTERROG EVL PM/IDS: CPT

## 2024-12-03 PROCEDURE — 93295 DEV INTERROG REMOTE 1/2/MLT: CPT

## 2024-12-13 ENCOUNTER — APPOINTMENT (OUTPATIENT)
Dept: ELECTROPHYSIOLOGY | Facility: CLINIC | Age: 29
End: 2024-12-13

## 2024-12-13 DIAGNOSIS — I47.20 VENTRICULAR TACHYCARDIA, UNSPECIFIED: ICD-10-CM

## 2024-12-13 DIAGNOSIS — I42.8 OTHER CARDIOMYOPATHIES: ICD-10-CM

## 2025-01-02 ENCOUNTER — APPOINTMENT (OUTPATIENT)
Dept: CARDIOLOGY | Facility: CLINIC | Age: 30
End: 2025-01-02
Payer: COMMERCIAL

## 2025-01-02 VITALS
WEIGHT: 189 LBS | BODY MASS INDEX: 33.48 KG/M2 | HEART RATE: 73 BPM | OXYGEN SATURATION: 99 % | SYSTOLIC BLOOD PRESSURE: 114 MMHG | DIASTOLIC BLOOD PRESSURE: 68 MMHG

## 2025-01-02 DIAGNOSIS — I51.9 HEART DISEASE, UNSPECIFIED: ICD-10-CM

## 2025-01-02 DIAGNOSIS — I42.8 OTHER CARDIOMYOPATHIES: ICD-10-CM

## 2025-01-02 PROCEDURE — 99214 OFFICE O/P EST MOD 30 MIN: CPT

## 2025-01-03 ENCOUNTER — NON-APPOINTMENT (OUTPATIENT)
Age: 30
End: 2025-01-03

## 2025-01-03 ENCOUNTER — APPOINTMENT (OUTPATIENT)
Dept: ELECTROPHYSIOLOGY | Facility: CLINIC | Age: 30
End: 2025-01-03
Payer: COMMERCIAL

## 2025-01-03 ENCOUNTER — APPOINTMENT (OUTPATIENT)
Dept: ELECTROPHYSIOLOGY | Facility: CLINIC | Age: 30
End: 2025-01-03

## 2025-01-03 VITALS
OXYGEN SATURATION: 99 % | BODY MASS INDEX: 33.66 KG/M2 | WEIGHT: 190 LBS | DIASTOLIC BLOOD PRESSURE: 70 MMHG | HEIGHT: 63 IN | HEART RATE: 75 BPM | SYSTOLIC BLOOD PRESSURE: 110 MMHG

## 2025-01-03 DIAGNOSIS — I42.8 OTHER CARDIOMYOPATHIES: ICD-10-CM

## 2025-01-03 DIAGNOSIS — I47.29 OTHER VENTRICULAR TACHYCARDIA: ICD-10-CM

## 2025-01-03 DIAGNOSIS — I47.20 VENTRICULAR TACHYCARDIA, UNSPECIFIED: ICD-10-CM

## 2025-01-03 PROCEDURE — 99215 OFFICE O/P EST HI 40 MIN: CPT | Mod: 25

## 2025-01-03 PROCEDURE — 93000 ELECTROCARDIOGRAM COMPLETE: CPT

## 2025-01-09 ENCOUNTER — NON-APPOINTMENT (OUTPATIENT)
Age: 30
End: 2025-01-09

## 2025-01-28 ENCOUNTER — APPOINTMENT (OUTPATIENT)
Dept: CV DIAGNOSITCS | Facility: HOSPITAL | Age: 30
End: 2025-01-28

## 2025-01-28 ENCOUNTER — OUTPATIENT (OUTPATIENT)
Dept: OUTPATIENT SERVICES | Facility: HOSPITAL | Age: 30
LOS: 1 days | End: 2025-01-28
Payer: COMMERCIAL

## 2025-01-28 ENCOUNTER — RESULT REVIEW (OUTPATIENT)
Age: 30
End: 2025-01-28

## 2025-01-28 DIAGNOSIS — Z95.810 PRESENCE OF AUTOMATIC (IMPLANTABLE) CARDIAC DEFIBRILLATOR: Chronic | ICD-10-CM

## 2025-01-28 DIAGNOSIS — I42.8 OTHER CARDIOMYOPATHIES: ICD-10-CM

## 2025-01-28 PROCEDURE — C8929: CPT

## 2025-01-28 PROCEDURE — 93306 TTE W/DOPPLER COMPLETE: CPT | Mod: 26

## 2025-02-12 PROCEDURE — 93248 EXT ECG>7D<15D REV&INTERPJ: CPT

## 2025-02-28 ENCOUNTER — NON-APPOINTMENT (OUTPATIENT)
Age: 30
End: 2025-02-28

## 2025-03-05 ENCOUNTER — NON-APPOINTMENT (OUTPATIENT)
Age: 30
End: 2025-03-05

## 2025-03-05 ENCOUNTER — APPOINTMENT (OUTPATIENT)
Dept: ELECTROPHYSIOLOGY | Facility: CLINIC | Age: 30
End: 2025-03-05
Payer: COMMERCIAL

## 2025-03-05 VITALS
BODY MASS INDEX: 31.89 KG/M2 | SYSTOLIC BLOOD PRESSURE: 107 MMHG | HEIGHT: 63 IN | HEART RATE: 85 BPM | WEIGHT: 180 LBS | DIASTOLIC BLOOD PRESSURE: 68 MMHG | OXYGEN SATURATION: 99 %

## 2025-03-05 PROCEDURE — 93261 INTERROGATE SUBQ DEFIB: CPT

## 2025-03-05 PROCEDURE — 93000 ELECTROCARDIOGRAM COMPLETE: CPT | Mod: 59

## 2025-03-06 ENCOUNTER — TRANSCRIPTION ENCOUNTER (OUTPATIENT)
Age: 30
End: 2025-03-06

## 2025-03-06 ENCOUNTER — OUTPATIENT (OUTPATIENT)
Dept: OUTPATIENT SERVICES | Facility: HOSPITAL | Age: 30
LOS: 1 days | End: 2025-03-06
Payer: COMMERCIAL

## 2025-03-06 VITALS
HEART RATE: 80 BPM | DIASTOLIC BLOOD PRESSURE: 59 MMHG | HEIGHT: 63 IN | RESPIRATION RATE: 18 BRPM | SYSTOLIC BLOOD PRESSURE: 116 MMHG | OXYGEN SATURATION: 100 % | WEIGHT: 179.9 LBS | TEMPERATURE: 98 F

## 2025-03-06 VITALS — RESPIRATION RATE: 15 BRPM | SYSTOLIC BLOOD PRESSURE: 96 MMHG | OXYGEN SATURATION: 100 % | HEART RATE: 78 BPM

## 2025-03-06 DIAGNOSIS — Z95.810 PRESENCE OF AUTOMATIC (IMPLANTABLE) CARDIAC DEFIBRILLATOR: Chronic | ICD-10-CM

## 2025-03-06 DIAGNOSIS — I47.20 VENTRICULAR TACHYCARDIA, UNSPECIFIED: ICD-10-CM

## 2025-03-06 PROCEDURE — 33285 INSJ SUBQ CAR RHYTHM MNTR: CPT

## 2025-03-06 PROCEDURE — C1764: CPT

## 2025-03-06 RX ORDER — SACUBITRIL AND VALSARTAN 49; 51 MG/1; MG/1
1 TABLET, FILM COATED ORAL
Refills: 0 | DISCHARGE

## 2025-03-06 RX ORDER — CEFAZOLIN SODIUM IN 0.9 % NACL 3 G/100 ML
2000 INTRAVENOUS SOLUTION, PIGGYBACK (ML) INTRAVENOUS ONCE
Refills: 0 | Status: COMPLETED | OUTPATIENT
Start: 2025-03-06 | End: 2025-03-06

## 2025-03-06 RX ADMIN — Medication 100 MILLIGRAM(S): at 08:03

## 2025-03-06 NOTE — ASU DISCHARGE PLAN (ADULT/PEDIATRIC) - CARE PROVIDER_API CALL
Jeferson Marshall  Cardiac Electrophysiology  [address] [place] [id number]  Phone: [phone]  Fax: [phone]  Follow Up Time:

## 2025-03-06 NOTE — PATIENT PROFILE ADULT - IS THERE A SUSPICION OF ABUSE/NEGLIGENCE?
"LABOR NOTE    Resident to bedside for routine cervical check.    S:  Complaints: No.  Epidural working: N/A    O: /70   Pulse 80   Temp 98.3 °F (36.8 °C) (Oral)   Resp 16   Ht 5' 2" (1.575 m)   Wt 89 kg (196 lb 3.4 oz)   LMP 10/31/2021   SpO2 97%   Breastfeeding No   BMI 35.89 kg/m²       FHT: Cat 1 (reassuring), baseline 140, moderate BTBV, + accels, no decels  CTX: q2-3  SVE: 2/80/-3, xie in place, pit at 12    TIMELINE:  2230: //HI  2310: Cytotec #1  0230: /-3, failed xie balloon placement\  0330: cytotec # 2  0845: /-3, xie balloon placed, cytotec #3  1420: xie in place  1700: 80/-3, xie in place  2130: 2/80/-3, xie in place      A/P: 31 y.o.  at 39w5d, admitted for IOL    Labor management:  - Continue Close Maternal/Fetal Monitoring  - Recheck 2-4 hours or PRN    Malissa Pickering MD  PGY-1  Obstetrics & Gynecology   " no

## 2025-03-06 NOTE — PATIENT PROFILE ADULT - FALL HARM RISK - UNIVERSAL INTERVENTIONS
Bed in lowest position, wheels locked, appropriate side rails in place/Call bell, personal items and telephone in reach/Instruct patient to call for assistance before getting out of bed or chair/Non-slip footwear when patient is out of bed/[place]oph[place] to call system/Physically safe environment - no spills, clutter or unnecessary equipment/Purposeful Proactive Rounding/Room/bathroom lighting operational, light cord in reach

## 2025-03-06 NOTE — H&P CARDIOLOGY - HISTORY OF PRESENT ILLNESS
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 She underwent catheter ablation of ventricular tachycardia on October 30th, 2024. and now presents for ILR with Dr Marshall

## 2025-03-06 NOTE — ASU DISCHARGE PLAN (ADULT/PEDIATRIC) - FINANCIAL ASSISTANCE
[facility] provides services at a reduced cost to those who are determined to be eligible through [facility]’s financial assistance program. Information regarding [facility]’s financial assistance program can be found by going to [email or web address] or by calling [phone].

## 2025-03-18 ENCOUNTER — NON-APPOINTMENT (OUTPATIENT)
Age: 30
End: 2025-03-18

## 2025-03-18 ENCOUNTER — APPOINTMENT (OUTPATIENT)
Dept: ELECTROPHYSIOLOGY | Facility: CLINIC | Age: 30
End: 2025-03-18

## 2025-03-18 ENCOUNTER — RESULT CHARGE (OUTPATIENT)
Age: 30
End: 2025-03-18

## 2025-03-18 VITALS
OXYGEN SATURATION: 100 % | BODY MASS INDEX: 31.89 KG/M2 | DIASTOLIC BLOOD PRESSURE: 66 MMHG | HEART RATE: 76 BPM | HEIGHT: 63 IN | WEIGHT: 180 LBS | SYSTOLIC BLOOD PRESSURE: 93 MMHG

## 2025-03-18 PROCEDURE — 99212 OFFICE O/P EST SF 10 MIN: CPT

## 2025-03-18 PROCEDURE — 93291 INTERROG DEV EVAL SCRMS IP: CPT | Mod: 59

## 2025-03-18 PROCEDURE — 93261 INTERROGATE SUBQ DEFIB: CPT

## 2025-03-18 PROCEDURE — 93000 ELECTROCARDIOGRAM COMPLETE: CPT | Mod: 59

## 2025-03-31 ENCOUNTER — NON-APPOINTMENT (OUTPATIENT)
Age: 30
End: 2025-03-31

## 2025-03-31 ENCOUNTER — APPOINTMENT (OUTPATIENT)
Dept: CARDIOLOGY | Facility: CLINIC | Age: 30
End: 2025-03-31

## 2025-03-31 VITALS
WEIGHT: 191 LBS | HEART RATE: 73 BPM | HEIGHT: 63 IN | OXYGEN SATURATION: 98 % | SYSTOLIC BLOOD PRESSURE: 102 MMHG | BODY MASS INDEX: 33.84 KG/M2 | DIASTOLIC BLOOD PRESSURE: 80 MMHG

## 2025-03-31 DIAGNOSIS — Z86.79 PERSONAL HISTORY OF OTHER DISEASES OF THE CIRCULATORY SYSTEM: ICD-10-CM

## 2025-03-31 DIAGNOSIS — I42.8 OTHER CARDIOMYOPATHIES: ICD-10-CM

## 2025-03-31 DIAGNOSIS — Z95.810 PRESENCE OF AUTOMATIC (IMPLANTABLE) CARDIAC DEFIBRILLATOR: ICD-10-CM

## 2025-03-31 PROCEDURE — 99213 OFFICE O/P EST LOW 20 MIN: CPT

## 2025-04-07 ENCOUNTER — EMERGENCY (EMERGENCY)
Facility: HOSPITAL | Age: 30
LOS: 1 days | End: 2025-04-07
Attending: EMERGENCY MEDICINE
Payer: COMMERCIAL

## 2025-04-07 VITALS
RESPIRATION RATE: 18 BRPM | DIASTOLIC BLOOD PRESSURE: 80 MMHG | TEMPERATURE: 98 F | OXYGEN SATURATION: 99 % | HEART RATE: 76 BPM | HEIGHT: 63 IN | WEIGHT: 179.9 LBS | SYSTOLIC BLOOD PRESSURE: 111 MMHG

## 2025-04-07 VITALS
HEART RATE: 72 BPM | RESPIRATION RATE: 18 BRPM | DIASTOLIC BLOOD PRESSURE: 78 MMHG | OXYGEN SATURATION: 100 % | TEMPERATURE: 98 F | SYSTOLIC BLOOD PRESSURE: 118 MMHG

## 2025-04-07 DIAGNOSIS — Z95.810 PRESENCE OF AUTOMATIC (IMPLANTABLE) CARDIAC DEFIBRILLATOR: Chronic | ICD-10-CM

## 2025-04-07 LAB
ALBUMIN SERPL ELPH-MCNC: 4.4 G/DL — SIGNIFICANT CHANGE UP (ref 3.3–5)
ALBUMIN SERPL ELPH-MCNC: 4.5 G/DL — SIGNIFICANT CHANGE UP (ref 3.3–5)
ALP SERPL-CCNC: 72 U/L — SIGNIFICANT CHANGE UP (ref 40–120)
ALP SERPL-CCNC: 76 U/L — SIGNIFICANT CHANGE UP (ref 40–120)
ALT FLD-CCNC: 18 U/L — SIGNIFICANT CHANGE UP (ref 10–45)
ALT FLD-CCNC: 19 U/L — SIGNIFICANT CHANGE UP (ref 10–45)
ANION GAP SERPL CALC-SCNC: 14 MMOL/L — SIGNIFICANT CHANGE UP (ref 5–17)
ANION GAP SERPL CALC-SCNC: 16 MMOL/L — SIGNIFICANT CHANGE UP (ref 5–17)
AST SERPL-CCNC: 19 U/L — SIGNIFICANT CHANGE UP (ref 10–40)
AST SERPL-CCNC: 22 U/L — SIGNIFICANT CHANGE UP (ref 10–40)
BASOPHILS # BLD AUTO: 0.06 K/UL — SIGNIFICANT CHANGE UP (ref 0–0.2)
BASOPHILS NFR BLD AUTO: 1 % — SIGNIFICANT CHANGE UP (ref 0–2)
BILIRUB SERPL-MCNC: 0.4 MG/DL — SIGNIFICANT CHANGE UP (ref 0.2–1.2)
BILIRUB SERPL-MCNC: 0.4 MG/DL — SIGNIFICANT CHANGE UP (ref 0.2–1.2)
BUN SERPL-MCNC: 12 MG/DL — SIGNIFICANT CHANGE UP (ref 7–23)
BUN SERPL-MCNC: 13 MG/DL — SIGNIFICANT CHANGE UP (ref 7–23)
CALCIUM SERPL-MCNC: 9.3 MG/DL — SIGNIFICANT CHANGE UP (ref 8.4–10.5)
CALCIUM SERPL-MCNC: 9.6 MG/DL — SIGNIFICANT CHANGE UP (ref 8.4–10.5)
CHLORIDE SERPL-SCNC: 105 MMOL/L — SIGNIFICANT CHANGE UP (ref 96–108)
CHLORIDE SERPL-SCNC: 106 MMOL/L — SIGNIFICANT CHANGE UP (ref 96–108)
CO2 SERPL-SCNC: 17 MMOL/L — LOW (ref 22–31)
CO2 SERPL-SCNC: 18 MMOL/L — LOW (ref 22–31)
CREAT SERPL-MCNC: 0.63 MG/DL — SIGNIFICANT CHANGE UP (ref 0.5–1.3)
CREAT SERPL-MCNC: 0.66 MG/DL — SIGNIFICANT CHANGE UP (ref 0.5–1.3)
EGFR: 122 ML/MIN/1.73M2 — SIGNIFICANT CHANGE UP
EGFR: 122 ML/MIN/1.73M2 — SIGNIFICANT CHANGE UP
EGFR: 123 ML/MIN/1.73M2 — SIGNIFICANT CHANGE UP
EGFR: 123 ML/MIN/1.73M2 — SIGNIFICANT CHANGE UP
EOSINOPHIL # BLD AUTO: 0.09 K/UL — SIGNIFICANT CHANGE UP (ref 0–0.5)
EOSINOPHIL NFR BLD AUTO: 1.5 % — SIGNIFICANT CHANGE UP (ref 0–6)
GLUCOSE SERPL-MCNC: 74 MG/DL — SIGNIFICANT CHANGE UP (ref 70–99)
GLUCOSE SERPL-MCNC: 79 MG/DL — SIGNIFICANT CHANGE UP (ref 70–99)
HCG SERPL-ACNC: <2 MIU/ML — SIGNIFICANT CHANGE UP
HCT VFR BLD CALC: 43.3 % — SIGNIFICANT CHANGE UP (ref 34.5–45)
HGB BLD-MCNC: 14.4 G/DL — SIGNIFICANT CHANGE UP (ref 11.5–15.5)
HIV 1 & 2 AB SERPL IA.RAPID: SIGNIFICANT CHANGE UP
IMM GRANULOCYTES NFR BLD AUTO: 0.2 % — SIGNIFICANT CHANGE UP (ref 0–0.9)
LYMPHOCYTES # BLD AUTO: 2.43 K/UL — SIGNIFICANT CHANGE UP (ref 1–3.3)
LYMPHOCYTES # BLD AUTO: 41.6 % — SIGNIFICANT CHANGE UP (ref 13–44)
MCHC RBC-ENTMCNC: 29.8 PG — SIGNIFICANT CHANGE UP (ref 27–34)
MCHC RBC-ENTMCNC: 33.3 G/DL — SIGNIFICANT CHANGE UP (ref 32–36)
MCV RBC AUTO: 89.6 FL — SIGNIFICANT CHANGE UP (ref 80–100)
MONOCYTES # BLD AUTO: 0.31 K/UL — SIGNIFICANT CHANGE UP (ref 0–0.9)
MONOCYTES NFR BLD AUTO: 5.3 % — SIGNIFICANT CHANGE UP (ref 2–14)
NEUTROPHILS # BLD AUTO: 2.94 K/UL — SIGNIFICANT CHANGE UP (ref 1.8–7.4)
NEUTROPHILS NFR BLD AUTO: 50.4 % — SIGNIFICANT CHANGE UP (ref 43–77)
NRBC BLD AUTO-RTO: 0 /100 WBCS — SIGNIFICANT CHANGE UP (ref 0–0)
PLATELET # BLD AUTO: SIGNIFICANT CHANGE UP K/UL (ref 150–400)
POTASSIUM SERPL-MCNC: 4.1 MMOL/L — SIGNIFICANT CHANGE UP (ref 3.5–5.3)
POTASSIUM SERPL-MCNC: 4.1 MMOL/L — SIGNIFICANT CHANGE UP (ref 3.5–5.3)
POTASSIUM SERPL-SCNC: 4.1 MMOL/L — SIGNIFICANT CHANGE UP (ref 3.5–5.3)
POTASSIUM SERPL-SCNC: 4.1 MMOL/L — SIGNIFICANT CHANGE UP (ref 3.5–5.3)
PROT SERPL-MCNC: 7.7 G/DL — SIGNIFICANT CHANGE UP (ref 6–8.3)
PROT SERPL-MCNC: 8.1 G/DL — SIGNIFICANT CHANGE UP (ref 6–8.3)
RBC # BLD: 4.83 M/UL — SIGNIFICANT CHANGE UP (ref 3.8–5.2)
RBC # FLD: 12.5 % — SIGNIFICANT CHANGE UP (ref 10.3–14.5)
SODIUM SERPL-SCNC: 137 MMOL/L — SIGNIFICANT CHANGE UP (ref 135–145)
SODIUM SERPL-SCNC: 139 MMOL/L — SIGNIFICANT CHANGE UP (ref 135–145)
WBC # BLD: 5.84 K/UL — SIGNIFICANT CHANGE UP (ref 3.8–10.5)
WBC # FLD AUTO: 5.84 K/UL — SIGNIFICANT CHANGE UP (ref 3.8–10.5)

## 2025-04-07 PROCEDURE — 80074 ACUTE HEPATITIS PANEL: CPT

## 2025-04-07 PROCEDURE — 99284 EMERGENCY DEPT VISIT MOD MDM: CPT

## 2025-04-07 PROCEDURE — 80053 COMPREHEN METABOLIC PANEL: CPT

## 2025-04-07 PROCEDURE — 99284 EMERGENCY DEPT VISIT MOD MDM: CPT | Mod: 25

## 2025-04-07 PROCEDURE — 86703 HIV-1/HIV-2 1 RESULT ANTBDY: CPT

## 2025-04-07 PROCEDURE — 84702 CHORIONIC GONADOTROPIN TEST: CPT

## 2025-04-07 PROCEDURE — 93005 ELECTROCARDIOGRAM TRACING: CPT

## 2025-04-07 PROCEDURE — 85025 COMPLETE CBC W/AUTO DIFF WBC: CPT

## 2025-04-07 PROCEDURE — 93010 ELECTROCARDIOGRAM REPORT: CPT

## 2025-04-07 RX ORDER — DOLUTEGRAVIR SODIUM 5 MG/1
50 TABLET, FOR SUSPENSION ORAL ONCE
Refills: 0 | Status: COMPLETED | OUTPATIENT
Start: 2025-04-07 | End: 2025-04-07

## 2025-04-07 RX ORDER — DOLUTEGRAVIR SODIUM 5 MG/1
1 TABLET, FOR SUSPENSION ORAL
Qty: 28 | Refills: 0
Start: 2025-04-07 | End: 2025-05-04

## 2025-04-07 RX ORDER — ONDANSETRON HCL/PF 4 MG/2 ML
1 VIAL (ML) INJECTION
Qty: 30 | Refills: 0
Start: 2025-04-07 | End: 2025-04-16

## 2025-04-07 RX ADMIN — DOLUTEGRAVIR SODIUM 50 MILLIGRAM(S): 5 TABLET, FOR SUSPENSION ORAL at 23:19

## 2025-04-07 RX ADMIN — Medication 1 TABLET(S): at 23:19

## 2025-04-07 NOTE — ED PROVIDER NOTE - CLINICAL SUMMARY MEDICAL DECISION MAKING FREE TEXT BOX
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 Pt then washed hand with soap, cleaned with alcohol and continued working. Pt presents to the ED for evaluation. Pt reports that she had full childhood vaccination series.  Advised pt that given that pt is immunized against hepatitis B, there is low risk of kavya it from this exposure. Advised pt that there is no PEP for Hep C, therefore, pt is at risk for kavya it from this exposure, however, explained that there is a treatment for it if pt were to develop it in the future. Pt advised that if source pt were to have HIV, there is a chance of kavya it from an exposure, even with a negative initial rapid HIV test given the possibility of a "window period" when source patient has a negative HIV test result but can still have active infection. Explained to pt that they may begin HIV PEP 72 hrs from the exposure to prevent transmission. Advised pt that if we don't know source pt's HIV status that HIV PEP is recommended. Pt agreed to taking HIV PEP. Advised to f/u with Infectious Disease Clinic this week. Return precautions given to pt. Knows to f/u ID this week and to return to the ED sooner for any worsening/concerning sx. Baseline labs drawn (CBC, CMP, hepatitis panel and rapid HIV test). 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 Pt then washed hand with soap, cleaned with alcohol and continued working. Pt presents to the ED for evaluation. Pt reports that she had full childhood vaccination series.  Advised pt that given that pt is immunized against hepatitis B, there is low risk of kavya it from this exposure. Advised pt that there is no PEP for Hep C, therefore, pt is at risk for kavya it from this exposure, however, explained that there is a treatment for it if pt were to develop it in the future. Pt advised that if source pt were to have HIV, there is a chance of kavya it from an exposure, even with a negative initial rapid HIV test given the possibility of a "window period" when source patient has a negative HIV test result but can still have active infection. Explained to pt that they may begin HIV PEP 72 hrs from the exposure to prevent transmission. Advised pt that if we don't know source pt's HIV status that HIV PEP is recommended. Pt agreed to taking HIV PEP. Advised to f/u with Infectious Disease Clinic this week. Return precautions given to pt. Knows to f/u ID this week and to return to the ED sooner for any worsening/concerning sx. Baseline labs drawn (CBC, CMP, hepatitis panel and rapid HIV test) and EKG.

## 2025-04-07 NOTE — ED PROVIDER NOTE - NSFOLLOWUPINSTRUCTIONS_ED_ALL_ED_FT
You had a needlestick exposure today. You opted to begin HIV post-exposure prophylaxis. It is a 28-day course of 2 pills that you will take daily. Take the course daily. Follow-up with the [facility] this week. They will help guide you on further follow-up and next steps. Continue taking all your home medications as prescribed.    Return to the ER if you develop: chest pain, shortness of breath, fainting, dizziness, abdominal pain, back pain, difficulty keeping down food, high fever, rash, nausea and vomiting, or anything else of concern to you.    PEP (Postexposure Prophylaxis)    WHAT YOU NEED TO KNOW:    Postexposure prophylaxis (PEP) is medical care given to prevent HIV infection, hepatitis B, and other diseases. PEP may include first aid, testing, and medicines. Exposure happens when you have contact with another person's blood, semen, or vaginal fluid. You are exposed if these fluids touch an open area of your skin, such as a cut, or touch a mucus membrane. You can also be exposed by a stick from an infected needle.    DISCHARGE INSTRUCTIONS:    Return to the emergency department if:    You have a fever.    You have a rash.    You have new muscle pain or pain in your back or abdomen.    You urinate more often than usual, have blood in your urine, or have pain while urinating.    You are more thirsty than usual.    You have trouble swallowing or breathing.  Call your doctor if:    You have nausea or diarrhea.    You are more tired than usual.    You have new headaches, or you feel dizzy.    You have trouble sleeping.    Your eyes or skin turn yellow.    You are not eating because of appetite loss.    You are or may be pregnant.    You have questions or concerns about your condition or care.  Medicines: You may need any of the following:    Antiretrovirals help prevent HIV infection. PEP is a combination of antiviral medicines that help prevent HIV from reproducing. This helps keep the viral load undetectable. You must start PEP within 72 hours of possible exposure and continue for 28 days.    The hepatitis B vaccine helps prevent hepatitis B. You will need 3 doses (shots) of the vaccine. The second dose should be taken 1 to 2 months after the first dose. The third dose should be taken 4 to 6 months after the first dose.    Antibiotics help treat or prevent a bacterial infection.    Take your medicine as directed. Contact your healthcare provider if you think your medicine is not helping or if you have side effects. Tell your provider if you are allergic to any medicine. Keep a list of the medicines, vitamins, and herbs you take. Include the amounts, and when and why you take them. Bring the list or the pill bottles to follow-up visits. Carry your medicine list with you in case of an emergency.  Precautions: In case you are HIV-positive, you will need to take steps to prevent spreading HIV to others:    Have safe sex. Use a latex condom each time you have sex. Ask your healthcare provider what to use if you or your partner has a latex allergy.    Do not share needles with anyone. Always use needles that are sterile (germ-free). Talk to your provider about how to get clean needles.    Follow all safety rules at your workplace if you are at risk of exposure. Be sure to use safety equipment as needed.    Get the hepatitis B vaccine, if needed. Your healthcare provider can tell you if you need this vaccine.    Ask about breastfeeding. Do not breastfeed unless you know you are not infected. Talk to your provider if you have any questions or concerns about breastfeeding.  In case of future exposure: If you think you have been exposed, get first aid right away. If you are at work, follow work policy to report the exposure. The type of first aid you need depends on the part of your body that was exposed:    Wash the area on and around open skin right away with soap and water. Use a gel hand  if you do not have soap or running water. Do not use anything harsh, such as bleach. Do not squeeze or rub the skin.    Rinse your eyes with water or saline (a salt solution) right away. Be sure to clean well by moving your eyelids with your fingers as you rinse. Keep contact lenses in while rinsing your eyes, then remove and clean them as usual. Avoid soap or other .    Spit out the blood or body fluid right away. Rinse your mouth with water or saline several times. Do not put soap or other  in your mouth.  Follow up with your doctor as directed: If you did not receive any treatment after the exposure, you will need to follow up within 1 week. If you were given antiretrovirals, you will need a follow-up visit in about 2 weeks. More HIV testing will be needed 6 weeks, 3 months, and 6 months after the exposure. Write down your questions so you remember to ask them during your visits.    For support and more information:    National Center for HIV/AIDS, Viral Hepatitis, STD, and TB Prevention, CDC  Web Address: www.cdc.gov/nchhstp/  The National Clinicians’ Post-Exposure Prophylaxis Hotline  Web Address: [email or web address].[email or web address]/pepline/

## 2025-04-07 NOTE — ED PROVIDER NOTE - PATIENT PORTAL LINK FT
You can access the FollowMyHealth Patient Portal offered by [facility] by registering at the following website: [email or web address]. By joining [employer]’s FollowMyHealth portal, you will also be able to view your health information using other applications (apps) compatible with our system.

## 2025-04-07 NOTE — ED ADULT NURSE NOTE - CAS EDN DISCHARGE ASSESSMENT
A user error has taken place: encounter opened in error, closed for administrative reasons, orders placed in error, not carried out on this patient.     Alert and oriented to person, place and time

## 2025-04-07 NOTE — ED ADULT NURSE NOTE - OBJECTIVE STATEMENT
30 y F PMH r sided heart failure w defibrillator presented to ED for needle stcik. Pt endorse working at [facility] and was drawing blood. After removing butterfly needle from pt, she poked her L hand. Pt denies pain, sensory and strengthg intact in L hand. Pt denies chest pain, SOB, headahces, abdominal pain, back pain, n/v/d, changed in vision, urinary symptoms. Pt alert & oriented x 3 . able to follow commands, speech clear. Breathing spontaneous and nonlabored. Pt spontaneously moving all extremities,  Sensory intact. . Skin warm, dry & intact. Pt in , stretcher locked and in lowest position . Call bell within reach and instructed on how to use.

## 2025-04-07 NOTE — ED PROVIDER NOTE - DIFFERENTIAL DIAGNOSIS
Ddx includes, however, is not limited to: occupational exposure to blood, other Differential Diagnosis

## 2025-04-07 NOTE — ED ADULT TRIAGE NOTE - CHIEF COMPLAINT QUOTE
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

## 2025-04-07 NOTE — ED PROVIDER NOTE - NSPTACCESSSVCSAPPTDETAILS_ED_ALL_ED_FT
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 She needs follow-up in ID clinic THIS WEEK. Thank you.

## 2025-04-07 NOTE — ED PROVIDER NOTE - NSFOLLOWUPCLINICS_GEN_ALL_ED_FT
[facility] Infectious Disease  HIV/AIDS Research & Treatment  [address] [place] [address]  Phone: [phone]  Fax:   Follow Up Time: 1-3 Days

## 2025-04-08 ENCOUNTER — EMERGENCY (EMERGENCY)
Facility: HOSPITAL | Age: 30
LOS: 1 days | End: 2025-04-08
Attending: EMERGENCY MEDICINE
Payer: COMMERCIAL

## 2025-04-08 VITALS
HEIGHT: 63 IN | RESPIRATION RATE: 16 BRPM | SYSTOLIC BLOOD PRESSURE: 109 MMHG | TEMPERATURE: 98 F | HEART RATE: 81 BPM | OXYGEN SATURATION: 100 % | WEIGHT: 179.9 LBS | DIASTOLIC BLOOD PRESSURE: 77 MMHG

## 2025-04-08 DIAGNOSIS — Z95.810 PRESENCE OF AUTOMATIC (IMPLANTABLE) CARDIAC DEFIBRILLATOR: Chronic | ICD-10-CM

## 2025-04-08 LAB
HAV IGM SER-ACNC: SIGNIFICANT CHANGE UP
HBV CORE IGM SER-ACNC: SIGNIFICANT CHANGE UP
HBV SURFACE AG SER-ACNC: SIGNIFICANT CHANGE UP
HCV AB S/CO SERPL IA: 0.14 S/CO — SIGNIFICANT CHANGE UP (ref 0–0.79)
HCV AB SERPL-IMP: SIGNIFICANT CHANGE UP

## 2025-04-08 PROCEDURE — 99283 EMERGENCY DEPT VISIT LOW MDM: CPT

## 2025-04-08 PROCEDURE — 86706 HEP B SURFACE ANTIBODY: CPT

## 2025-04-08 NOTE — ED ADULT TRIAGE NOTE - CHIEF COMPLAINT QUOTE
requesting hepatitis B vaccine. Pt was seen here yesterday after blood exposure from accidental needle stick. Pt states she had non-reactive hep B blood test from yesterday.

## 2025-04-08 NOTE — ED PROVIDER NOTE - NSFOLLOWUPINSTRUCTIONS_ED_ALL_ED_FT
Please follow-up with your PCP and infectious disease as instructed during your ED visit yesterday.  Please take HIV postexposure prophylaxis as directed.    Blood work was sent today to determine if you still have immunity to hepatitis B.  Please follow-up these results with your PCP/infectious disease.

## 2025-04-08 NOTE — ED PROVIDER NOTE - CLINICAL SUMMARY MEDICAL DECISION MAKING FREE TEXT BOX
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  Patient presents to the ED today as she reviewed her results in the portal and noted her hepatitis B surface antigen to be nonreactive and was concerned she needed a hepatitis B vaccination.  I explained to patient that the hepatitis B surface antibody is a marker for prior immunization not hepatitis B surface antigen.  Will draw hepatitis B surface antibody in ED today to assess patient immunization status and patient advised to follow-up with that result with her PCP/infectious disease if she needs hepatitis B vaccination.  Patient offers no other acute complaints in the ED presented only for this targeted issue.  Exam is nonfocal head is NCAT, patient is no acute distress, vital signs reviewed and within acceptable limits, no evidence of respiratory distress, moving all extremities, clear and coherent speech, steady gait, neck supple full range of motion.    Plan to draw hepatitis B surface antibody, discharge with f/u instructions. -Bharat Fletcher PA-C

## 2025-04-08 NOTE — ED ADULT NURSE NOTE - OBJECTIVE STATEMENT
31 y/o female complaining of needle stick yesterday. PT is a&Ox4 breathing spontaneously speaking in full sentences with pmh of defibrillator placed in 2023, came to the Ed for needlestick yesterday presents to the ED for concern she may need a hepatitus B vaccine. Pt states she saw in the patient portal and it made her concerned.

## 2025-04-08 NOTE — ED PROVIDER NOTE - PATIENT PORTAL LINK FT
You can access the FollowMyHealth Patient Portal offered by [facility] by registering at the following website: [email or web address]. By joining [employer]’s FollowMyHealth portal, you will also be able to view your health information using other applications (apps) compatible with our system.

## 2025-04-08 NOTE — ED ADULT NURSE NOTE - NSFALLUNIVINTERV_ED_ALL_ED
Bed/Stretcher in lowest position, wheels locked, appropriate side rails in place/Call bell, personal items and telephone in reach/Instruct patient to call for assistance before getting out of bed/chair/stretcher/Non-slip footwear applied when patient is off stretcher/[place] to call system/Physically safe environment - no spills, clutter or unnecessary equipment/Purposeful proactive rounding/Room/bathroom lighting operational, light cord in reach

## 2025-04-09 ENCOUNTER — APPOINTMENT (OUTPATIENT)
Age: 30
End: 2025-04-09
Payer: COMMERCIAL

## 2025-04-09 VITALS
WEIGHT: 188 LBS | SYSTOLIC BLOOD PRESSURE: 114 MMHG | BODY MASS INDEX: 33.31 KG/M2 | OXYGEN SATURATION: 100 % | DIASTOLIC BLOOD PRESSURE: 68 MMHG | HEART RATE: 88 BPM | HEIGHT: 63 IN

## 2025-04-09 LAB — HBV SURFACE AB SER-ACNC: REACTIVE — SIGNIFICANT CHANGE UP

## 2025-04-09 PROCEDURE — 99213 OFFICE O/P EST LOW 20 MIN: CPT

## 2025-04-18 ENCOUNTER — APPOINTMENT (OUTPATIENT)
Dept: ELECTROPHYSIOLOGY | Facility: CLINIC | Age: 30
End: 2025-04-18

## 2025-04-18 PROCEDURE — 93298 REM INTERROG DEV EVAL SCRMS: CPT

## 2025-05-14 ENCOUNTER — NON-APPOINTMENT (OUTPATIENT)
Age: 30
End: 2025-05-14

## 2025-05-15 ENCOUNTER — EMERGENCY (EMERGENCY)
Facility: HOSPITAL | Age: 30
LOS: 1 days | End: 2025-05-15
Attending: EMERGENCY MEDICINE
Payer: COMMERCIAL

## 2025-05-15 VITALS
HEART RATE: 76 BPM | RESPIRATION RATE: 16 BRPM | SYSTOLIC BLOOD PRESSURE: 107 MMHG | TEMPERATURE: 98 F | OXYGEN SATURATION: 99 % | DIASTOLIC BLOOD PRESSURE: 73 MMHG

## 2025-05-15 VITALS
RESPIRATION RATE: 15 BRPM | HEART RATE: 76 BPM | TEMPERATURE: 98 F | DIASTOLIC BLOOD PRESSURE: 62 MMHG | SYSTOLIC BLOOD PRESSURE: 98 MMHG | OXYGEN SATURATION: 98 % | HEIGHT: 63 IN | WEIGHT: 179.9 LBS

## 2025-05-15 DIAGNOSIS — Z95.810 PRESENCE OF AUTOMATIC (IMPLANTABLE) CARDIAC DEFIBRILLATOR: Chronic | ICD-10-CM

## 2025-05-15 LAB
ALBUMIN SERPL ELPH-MCNC: 4.2 G/DL — SIGNIFICANT CHANGE UP (ref 3.3–5)
ALP SERPL-CCNC: 86 U/L — SIGNIFICANT CHANGE UP (ref 40–120)
ALT FLD-CCNC: 20 U/L — SIGNIFICANT CHANGE UP (ref 10–45)
ANION GAP SERPL CALC-SCNC: 14 MMOL/L — SIGNIFICANT CHANGE UP (ref 5–17)
AST SERPL-CCNC: 24 U/L — SIGNIFICANT CHANGE UP (ref 10–40)
BASOPHILS # BLD AUTO: 0.04 K/UL — SIGNIFICANT CHANGE UP (ref 0–0.2)
BASOPHILS NFR BLD AUTO: 0.6 % — SIGNIFICANT CHANGE UP (ref 0–2)
BILIRUB SERPL-MCNC: 0.2 MG/DL — SIGNIFICANT CHANGE UP (ref 0.2–1.2)
BUN SERPL-MCNC: 18 MG/DL — SIGNIFICANT CHANGE UP (ref 7–23)
CALCIUM SERPL-MCNC: 9.2 MG/DL — SIGNIFICANT CHANGE UP (ref 8.4–10.5)
CHLORIDE SERPL-SCNC: 104 MMOL/L — SIGNIFICANT CHANGE UP (ref 96–108)
CO2 SERPL-SCNC: 22 MMOL/L — SIGNIFICANT CHANGE UP (ref 22–31)
CREAT SERPL-MCNC: 0.88 MG/DL — SIGNIFICANT CHANGE UP (ref 0.5–1.3)
EGFR: 91 ML/MIN/1.73M2 — SIGNIFICANT CHANGE UP
EGFR: 91 ML/MIN/1.73M2 — SIGNIFICANT CHANGE UP
EOSINOPHIL # BLD AUTO: 0.09 K/UL — SIGNIFICANT CHANGE UP (ref 0–0.5)
EOSINOPHIL NFR BLD AUTO: 1.4 % — SIGNIFICANT CHANGE UP (ref 0–6)
GLUCOSE SERPL-MCNC: 103 MG/DL — HIGH (ref 70–99)
HBV SURFACE AB SER-ACNC: REACTIVE — SIGNIFICANT CHANGE UP
HBV SURFACE AG SER-ACNC: SIGNIFICANT CHANGE UP
HCG SERPL-ACNC: <2 MIU/ML — SIGNIFICANT CHANGE UP
HCT VFR BLD CALC: 42 % — SIGNIFICANT CHANGE UP (ref 34.5–45)
HCV AB S/CO SERPL IA: 0.06 S/CO — SIGNIFICANT CHANGE UP
HCV AB SERPL-IMP: SIGNIFICANT CHANGE UP
HGB BLD-MCNC: 13.8 G/DL — SIGNIFICANT CHANGE UP (ref 11.5–15.5)
HIV 1 & 2 AB SERPL IA.RAPID: SIGNIFICANT CHANGE UP
IMM GRANULOCYTES NFR BLD AUTO: 0.2 % — SIGNIFICANT CHANGE UP (ref 0–0.9)
LYMPHOCYTES # BLD AUTO: 1.93 K/UL — SIGNIFICANT CHANGE UP (ref 1–3.3)
LYMPHOCYTES # BLD AUTO: 30 % — SIGNIFICANT CHANGE UP (ref 13–44)
MCHC RBC-ENTMCNC: 29.7 PG — SIGNIFICANT CHANGE UP (ref 27–34)
MCHC RBC-ENTMCNC: 32.9 G/DL — SIGNIFICANT CHANGE UP (ref 32–36)
MCV RBC AUTO: 90.3 FL — SIGNIFICANT CHANGE UP (ref 80–100)
MONOCYTES # BLD AUTO: 0.32 K/UL — SIGNIFICANT CHANGE UP (ref 0–0.9)
MONOCYTES NFR BLD AUTO: 5 % — SIGNIFICANT CHANGE UP (ref 2–14)
NEUTROPHILS # BLD AUTO: 4.05 K/UL — SIGNIFICANT CHANGE UP (ref 1.8–7.4)
NEUTROPHILS NFR BLD AUTO: 62.8 % — SIGNIFICANT CHANGE UP (ref 43–77)
NRBC BLD AUTO-RTO: 0 /100 WBCS — SIGNIFICANT CHANGE UP (ref 0–0)
PLATELET # BLD AUTO: 266 K/UL — SIGNIFICANT CHANGE UP (ref 150–400)
POTASSIUM SERPL-MCNC: 3.5 MMOL/L — SIGNIFICANT CHANGE UP (ref 3.5–5.3)
POTASSIUM SERPL-SCNC: 3.5 MMOL/L — SIGNIFICANT CHANGE UP (ref 3.5–5.3)
PROT SERPL-MCNC: 7.4 G/DL — SIGNIFICANT CHANGE UP (ref 6–8.3)
RBC # BLD: 4.65 M/UL — SIGNIFICANT CHANGE UP (ref 3.8–5.2)
RBC # FLD: 13.2 % — SIGNIFICANT CHANGE UP (ref 10.3–14.5)
SODIUM SERPL-SCNC: 140 MMOL/L — SIGNIFICANT CHANGE UP (ref 135–145)
WBC # BLD: 6.44 K/UL — SIGNIFICANT CHANGE UP (ref 3.8–10.5)
WBC # FLD AUTO: 6.44 K/UL — SIGNIFICANT CHANGE UP (ref 3.8–10.5)

## 2025-05-15 PROCEDURE — 80053 COMPREHEN METABOLIC PANEL: CPT

## 2025-05-15 PROCEDURE — 99284 EMERGENCY DEPT VISIT MOD MDM: CPT

## 2025-05-15 PROCEDURE — 87340 HEPATITIS B SURFACE AG IA: CPT

## 2025-05-15 PROCEDURE — 86703 HIV-1/HIV-2 1 RESULT ANTBDY: CPT

## 2025-05-15 PROCEDURE — 86706 HEP B SURFACE ANTIBODY: CPT

## 2025-05-15 PROCEDURE — 99283 EMERGENCY DEPT VISIT LOW MDM: CPT

## 2025-05-15 PROCEDURE — 84702 CHORIONIC GONADOTROPIN TEST: CPT

## 2025-05-15 PROCEDURE — 85025 COMPLETE CBC W/AUTO DIFF WBC: CPT

## 2025-05-15 PROCEDURE — 86803 HEPATITIS C AB TEST: CPT

## 2025-05-15 RX ORDER — DOLUTEGRAVIR SODIUM 5 MG/1
50 TABLET, FOR SUSPENSION ORAL ONCE
Refills: 0 | Status: DISCONTINUED | OUTPATIENT
Start: 2025-05-15 | End: 2025-05-15

## 2025-05-15 RX ORDER — RALTEGRAVIR 400 MG/1
400 TABLET, FILM COATED ORAL ONCE
Refills: 0 | Status: COMPLETED | OUTPATIENT
Start: 2025-05-15 | End: 2025-05-15

## 2025-05-15 RX ORDER — DOLUTEGRAVIR SODIUM 5 MG/1
1 TABLET, FOR SUSPENSION ORAL
Qty: 28 | Refills: 0
Start: 2025-05-15 | End: 2025-06-11

## 2025-05-15 RX ADMIN — Medication 1 TABLET(S): at 13:15

## 2025-05-15 RX ADMIN — RALTEGRAVIR 400 MILLIGRAM(S): 400 TABLET, FILM COATED ORAL at 13:15

## 2025-05-17 ENCOUNTER — EMERGENCY (EMERGENCY)
Facility: HOSPITAL | Age: 30
LOS: 1 days | End: 2025-05-17
Attending: EMERGENCY MEDICINE
Payer: COMMERCIAL

## 2025-05-17 VITALS
SYSTOLIC BLOOD PRESSURE: 122 MMHG | HEIGHT: 63 IN | OXYGEN SATURATION: 100 % | TEMPERATURE: 98 F | RESPIRATION RATE: 20 BRPM | HEART RATE: 84 BPM | DIASTOLIC BLOOD PRESSURE: 77 MMHG | WEIGHT: 179.9 LBS

## 2025-05-17 DIAGNOSIS — Z95.810 PRESENCE OF AUTOMATIC (IMPLANTABLE) CARDIAC DEFIBRILLATOR: Chronic | ICD-10-CM

## 2025-05-17 PROCEDURE — 99281 EMR DPT VST MAYX REQ PHY/QHP: CPT

## 2025-05-17 PROCEDURE — 99284 EMERGENCY DEPT VISIT MOD MDM: CPT

## 2025-05-17 RX ORDER — DOLUTEGRAVIR SODIUM 5 MG/1
1 TABLET, FOR SUSPENSION ORAL
Qty: 28 | Refills: 0
Start: 2025-05-17 | End: 2025-06-13

## 2025-05-17 RX ORDER — DOLUTEGRAVIR SODIUM 5 MG/1
1 TABLET, FOR SUSPENSION ORAL
Qty: 30 | Refills: 0
Start: 2025-05-17 | End: 2025-06-15

## 2025-05-17 NOTE — ED PROVIDER NOTE - OBJECTIVE STATEMENT
30-year-old female PMH of right-sided heart failure with AICD placement presents for medication refill.  States that she had a needlestick at work that broke the skin on Wednesday.  She was prescribed prophylaxis HIV preventative medication that she has not been able to fill due to insurance and pharmacy issues.  Of note she had a needlestick about a month ago where she completed a full course.  She additionally has no other complaints today and feels fine.

## 2025-05-17 NOTE — ED PROVIDER NOTE - PHYSICAL EXAMINATION
Const: Awake, alert, no acute distress.  Well appearing.  Moving comfortably on stretcher.  HEENT: NC/AT.  Moist mucous membranes.  Eyes: Extraocular movements intact b/l.  No scleral icterus.  Neck: Full ROM without pain.  Cardiac: Extremities well perfused, normal coloration, no peripheral cyanosis.  No peripheral edema.  Resp: Speaking in full sentences.  No evidence of respiratory distress.  Abd: Non-distended.  Skin: Normal coloration.  No rashes, abrasions or lacerations.  Neuro: Awake, alert & oriented x 3.  Moves all extremities symmetrically.  No obvious focal deficits.

## 2025-05-17 NOTE — ED PROVIDER NOTE - PATIENT PORTAL LINK FT
You can access the FollowMyHealth Patient Portal offered by [facility] by registering at the following website: [email or web address]. By joining [employer]’s FollowMyHealth portal, you will also be able to view your health information using other applications (apps) compatible with our system.

## 2025-05-17 NOTE — ED ADULT NURSE NOTE - OBJECTIVE STATEMENT
30 y.o F A&Ox4 w. no sigificant PMH presents to ED for medication refill. Pt states that she had a needlestick incident on Wednesday and states that she came into the ED afterward fo medications. Pt states that her pharmacy was unable to fill the necessary medications until Monday. Pt states that she returned to have meds refilled to hold her over until Monday until her pharmacy can fill medications. Pt states that she was accidentally stuck with butterfly needle, pt states that she immediately washed the area with soap and water. Pt denies any adverse reactions from needlestick and endorses feeling fine. VSS at this time

## 2025-05-17 NOTE — ED PROVIDER NOTE - CLINICAL SUMMARY MEDICAL DECISION MAKING FREE TEXT BOX
Nate, PGY1: 30-year-old female PMH of right-sided heart failure with AICD placement presents for medication refill.  States that she had a needlestick at work that broke the skin on Wednesday.  She was prescribed prophylaxis HIV preventative medication that she has not been able to fill due to insurance and pharmacy issues.  Vitals are wnl PE is unremarkable. Will attempt to fill prescription at [facility] Nate, PGY1: 30-year-old female PMH of right-sided heart failure with AICD placement presents for medication refill.  States that she had a needlestick at work that broke the skin on Wednesday.  She was prescribed prophylaxis HIV preventative medication that she has not been able to fill due to insurance and pharmacy issues.  Vitals are wnl PE is unremarkable. Will attempt to fill prescription at [facility]    Dr. Templeton (Attending Physician)

## 2025-05-19 ENCOUNTER — NON-APPOINTMENT (OUTPATIENT)
Age: 30
End: 2025-05-19

## 2025-05-19 ENCOUNTER — OUTPATIENT (OUTPATIENT)
Dept: OUTPATIENT SERVICES | Facility: HOSPITAL | Age: 30
LOS: 1 days | End: 2025-05-19

## 2025-05-19 ENCOUNTER — APPOINTMENT (OUTPATIENT)
Dept: ELECTROPHYSIOLOGY | Facility: CLINIC | Age: 30
End: 2025-05-19
Payer: COMMERCIAL

## 2025-05-19 ENCOUNTER — APPOINTMENT (OUTPATIENT)
Dept: INTERNAL MEDICINE | Facility: CLINIC | Age: 30
End: 2025-05-19

## 2025-05-19 DIAGNOSIS — Z95.810 PRESENCE OF AUTOMATIC (IMPLANTABLE) CARDIAC DEFIBRILLATOR: Chronic | ICD-10-CM

## 2025-05-19 PROCEDURE — 93298 REM INTERROG DEV EVAL SCRMS: CPT

## 2025-05-20 ENCOUNTER — APPOINTMENT (OUTPATIENT)
Dept: INFECTIOUS DISEASE | Facility: CLINIC | Age: 30
End: 2025-05-20

## 2025-05-20 VITALS
OXYGEN SATURATION: 98 % | BODY MASS INDEX: 33.31 KG/M2 | TEMPERATURE: 98.5 F | HEIGHT: 63 IN | WEIGHT: 188 LBS | HEART RATE: 78 BPM

## 2025-05-20 VITALS — SYSTOLIC BLOOD PRESSURE: 85 MMHG | DIASTOLIC BLOOD PRESSURE: 65 MMHG

## 2025-05-20 DIAGNOSIS — Z20.9 CONTACT WITH AND (SUSPECTED) EXPOSURE TO UNSPECIFIED COMMUNICABLE DISEASE: ICD-10-CM

## 2025-05-20 LAB — HIV1+2 AB SPEC QL IA.RAPID: NONREACTIVE

## 2025-05-20 PROCEDURE — 99213 OFFICE O/P EST LOW 20 MIN: CPT

## 2025-05-27 LAB
HBV DNA # SERPL NAA+PROBE: NOT DETECTED IU/ML
HBV SURFACE AG SER QL: NONREACTIVE
HCV RNA SERPL NAA+PROBE-LOG IU: NOT DETECTED LOGIU/ML
HEPB DNA PCR INT: NOT DETECTED
HEPB DNA PCR LOG: NOT DETECTED LOGIU/ML
HEPC RNA INTERP: NOT DETECTED

## 2025-06-17 ENCOUNTER — APPOINTMENT (OUTPATIENT)
Dept: ELECTROPHYSIOLOGY | Facility: CLINIC | Age: 30
End: 2025-06-17

## 2025-06-20 ENCOUNTER — NON-APPOINTMENT (OUTPATIENT)
Age: 30
End: 2025-06-20

## 2025-06-20 ENCOUNTER — APPOINTMENT (OUTPATIENT)
Dept: ELECTROPHYSIOLOGY | Facility: CLINIC | Age: 30
End: 2025-06-20

## 2025-06-20 PROCEDURE — 93296 REM INTERROG EVL PM/IDS: CPT

## 2025-06-20 PROCEDURE — 93295 DEV INTERROG REMOTE 1/2/MLT: CPT

## 2025-07-22 ENCOUNTER — NON-APPOINTMENT (OUTPATIENT)
Age: 30
End: 2025-07-22

## 2025-07-22 ENCOUNTER — APPOINTMENT (OUTPATIENT)
Dept: ELECTROPHYSIOLOGY | Facility: CLINIC | Age: 30
End: 2025-07-22
Payer: COMMERCIAL

## 2025-07-22 PROCEDURE — 93298 REM INTERROG DEV EVAL SCRMS: CPT

## 2025-09-10 ENCOUNTER — APPOINTMENT (OUTPATIENT)
Dept: INFECTIOUS DISEASE | Facility: CLINIC | Age: 30
End: 2025-09-10
Payer: COMMERCIAL

## 2025-09-10 ENCOUNTER — APPOINTMENT (OUTPATIENT)
Dept: CARDIOLOGY | Facility: CLINIC | Age: 30
End: 2025-09-10

## 2025-09-10 VITALS
WEIGHT: 180 LBS | OXYGEN SATURATION: 98 % | HEIGHT: 63 IN | TEMPERATURE: 96.8 F | SYSTOLIC BLOOD PRESSURE: 136 MMHG | BODY MASS INDEX: 31.89 KG/M2 | HEART RATE: 83 BPM | DIASTOLIC BLOOD PRESSURE: 83 MMHG

## 2025-09-10 DIAGNOSIS — Z20.9 CONTACT WITH AND (SUSPECTED) EXPOSURE TO UNSPECIFIED COMMUNICABLE DISEASE: ICD-10-CM

## 2025-09-10 PROCEDURE — 99213 OFFICE O/P EST LOW 20 MIN: CPT

## 2025-09-11 ENCOUNTER — APPOINTMENT (OUTPATIENT)
Dept: CARDIOLOGY | Facility: CLINIC | Age: 30
End: 2025-09-11
Payer: COMMERCIAL

## 2025-09-11 ENCOUNTER — NON-APPOINTMENT (OUTPATIENT)
Age: 30
End: 2025-09-11

## 2025-09-11 VITALS
DIASTOLIC BLOOD PRESSURE: 66 MMHG | SYSTOLIC BLOOD PRESSURE: 104 MMHG | WEIGHT: 184 LBS | HEART RATE: 68 BPM | BODY MASS INDEX: 32.59 KG/M2 | OXYGEN SATURATION: 98 %

## 2025-09-11 DIAGNOSIS — R42 DIZZINESS AND GIDDINESS: ICD-10-CM

## 2025-09-11 DIAGNOSIS — Z95.810 PRESENCE OF AUTOMATIC (IMPLANTABLE) CARDIAC DEFIBRILLATOR: ICD-10-CM

## 2025-09-11 DIAGNOSIS — I50.810 RIGHT HEART FAILURE, UNSPECIFIED: ICD-10-CM

## 2025-09-11 DIAGNOSIS — I51.9 HEART DISEASE, UNSPECIFIED: ICD-10-CM

## 2025-09-11 DIAGNOSIS — I42.8 OTHER CARDIOMYOPATHIES: ICD-10-CM

## 2025-09-11 LAB
HBV CORE IGG+IGM SER QL: NONREACTIVE
HBV SURFACE AB SER QL: REACTIVE
HBV SURFACE AG SER QL: NONREACTIVE
HCV AB SER QL: NONREACTIVE
HCV S/CO RATIO: 0.09 S/CO
HIV1+2 AB SPEC QL IA.RAPID: NONREACTIVE

## 2025-09-11 PROCEDURE — 93000 ELECTROCARDIOGRAM COMPLETE: CPT

## 2025-09-11 PROCEDURE — 99213 OFFICE O/P EST LOW 20 MIN: CPT | Mod: 25

## 2025-09-11 RX ORDER — LOSARTAN POTASSIUM 25 MG/1
25 TABLET, FILM COATED ORAL DAILY
Qty: 90 | Refills: 3 | Status: ACTIVE | COMMUNITY
Start: 2025-09-11

## 2025-09-11 RX ORDER — SPIRONOLACTONE 25 MG/1
25 TABLET ORAL DAILY
Qty: 90 | Refills: 2 | Status: ACTIVE | COMMUNITY
Start: 2025-09-11 | End: 1900-01-01

## 2025-09-12 ENCOUNTER — APPOINTMENT (OUTPATIENT)
Dept: ELECTROPHYSIOLOGY | Facility: CLINIC | Age: 30
End: 2025-09-12

## (undated) DEVICE — ELCTR GROUNDING PAD ADULT COVIDIEN

## (undated) DEVICE — TUBING SUCTION CONN 6FT STERILE

## (undated) DEVICE — CLAMP BX HOT RAD JAW 3

## (undated) DEVICE — BRUSH COLONOSCOPY CYTOLOGY

## (undated) DEVICE — FOLEY HOLDER STATLOCK 2 WAY ADULT

## (undated) DEVICE — CATH IV SAFE BC 22G X 1" (BLUE)

## (undated) DEVICE — PACK IV START WITH CHG

## (undated) DEVICE — CATH IV SAFE BC 20G X 1.16" (PINK)

## (undated) DEVICE — TUBING SUCTION 20FT

## (undated) DEVICE — BIOPSY FORCEP RADIAL JAW 4 STANDARD WITH NEEDLE

## (undated) DEVICE — SUCTION YANKAUER NO CONTROL VENT

## (undated) DEVICE — TUBING IV SET GRAVITY 3Y 100" MACRO

## (undated) DEVICE — SYR LUER LOK 50CC

## (undated) DEVICE — POLY TRAP ETRAP

## (undated) DEVICE — SOL INJ NS 0.9% 500ML 2 PORT

## (undated) DEVICE — FORCEP RADIAL JAW 4 JUMBO 2.8MM 3.2MM 240CM ORANGE DISP

## (undated) DEVICE — IRRIGATOR BIO SHIELD

## (undated) DEVICE — SENSOR O2 FINGER ADULT